# Patient Record
Sex: FEMALE | Race: WHITE | Employment: OTHER | URBAN - METROPOLITAN AREA
[De-identification: names, ages, dates, MRNs, and addresses within clinical notes are randomized per-mention and may not be internally consistent; named-entity substitution may affect disease eponyms.]

---

## 2017-09-27 ENCOUNTER — OFFICE VISIT (OUTPATIENT)
Dept: FAMILY MEDICINE CLINIC | Facility: CLINIC | Age: 67
End: 2017-09-27

## 2017-09-27 ENCOUNTER — LAB ENCOUNTER (OUTPATIENT)
Dept: LAB | Age: 67
End: 2017-09-27
Attending: FAMILY MEDICINE
Payer: MEDICARE

## 2017-09-27 VITALS
DIASTOLIC BLOOD PRESSURE: 78 MMHG | HEIGHT: 61.42 IN | RESPIRATION RATE: 16 BRPM | HEART RATE: 73 BPM | BODY MASS INDEX: 24.04 KG/M2 | SYSTOLIC BLOOD PRESSURE: 132 MMHG | WEIGHT: 129 LBS

## 2017-09-27 DIAGNOSIS — Z13.6 SCREENING, ISCHEMIC HEART DISEASE: ICD-10-CM

## 2017-09-27 DIAGNOSIS — Z00.00 MEDICARE ANNUAL WELLNESS VISIT, INITIAL: Primary | ICD-10-CM

## 2017-09-27 DIAGNOSIS — Z12.31 VISIT FOR SCREENING MAMMOGRAM: ICD-10-CM

## 2017-09-27 DIAGNOSIS — Z78.0 POSTMENOPAUSAL: ICD-10-CM

## 2017-09-27 DIAGNOSIS — Z13.1 SCREENING FOR DIABETES MELLITUS: ICD-10-CM

## 2017-09-27 DIAGNOSIS — M85.80 OSTEOPENIA, UNSPECIFIED LOCATION: ICD-10-CM

## 2017-09-27 DIAGNOSIS — M89.9 BONE DISORDER: ICD-10-CM

## 2017-09-27 DIAGNOSIS — Z12.11 SCREENING FOR MALIGNANT NEOPLASM OF COLON: ICD-10-CM

## 2017-09-27 LAB
25-HYDROXYVITAMIN D (TOTAL): 36.9 NG/ML (ref 30–100)
ALBUMIN SERPL-MCNC: 3.7 G/DL (ref 3.5–4.8)
ALP LIVER SERPL-CCNC: 59 U/L (ref 55–142)
ALT SERPL-CCNC: 25 U/L (ref 14–54)
AST SERPL-CCNC: 16 U/L (ref 15–41)
BASOPHILS # BLD AUTO: 0.02 X10(3) UL (ref 0–0.1)
BASOPHILS NFR BLD AUTO: 0.5 %
BILIRUB SERPL-MCNC: 0.5 MG/DL (ref 0.1–2)
BUN BLD-MCNC: 12 MG/DL (ref 8–20)
CALCIUM BLD-MCNC: 8.6 MG/DL (ref 8.3–10.3)
CHLORIDE: 106 MMOL/L (ref 101–111)
CHOLEST SMN-MCNC: 231 MG/DL (ref ?–200)
CO2: 27 MMOL/L (ref 22–32)
CREAT BLD-MCNC: 0.65 MG/DL (ref 0.55–1.02)
EOSINOPHIL # BLD AUTO: 0.04 X10(3) UL (ref 0–0.3)
EOSINOPHIL NFR BLD AUTO: 1 %
ERYTHROCYTE [DISTWIDTH] IN BLOOD BY AUTOMATED COUNT: 14.4 % (ref 11.5–16)
GLUCOSE BLD-MCNC: 93 MG/DL (ref 70–99)
HCT VFR BLD AUTO: 40.4 % (ref 34–50)
HDLC SERPL-MCNC: 65 MG/DL (ref 45–?)
HDLC SERPL: 3.55 {RATIO} (ref ?–4.44)
HGB BLD-MCNC: 13.6 G/DL (ref 12–16)
IMMATURE GRANULOCYTE COUNT: 0.01 X10(3) UL (ref 0–1)
IMMATURE GRANULOCYTE RATIO %: 0.2 %
LDLC SERPL CALC-MCNC: 147 MG/DL (ref ?–130)
LDLC SERPL-MCNC: 19 MG/DL (ref 5–40)
LYMPHOCYTES # BLD AUTO: 1.04 X10(3) UL (ref 0.9–4)
LYMPHOCYTES NFR BLD AUTO: 24.9 %
M PROTEIN MFR SERPL ELPH: 7.1 G/DL (ref 6.1–8.3)
MCH RBC QN AUTO: 30.1 PG (ref 27–33.2)
MCHC RBC AUTO-ENTMCNC: 33.7 G/DL (ref 31–37)
MCV RBC AUTO: 89.4 FL (ref 81–100)
MONOCYTES # BLD AUTO: 0.26 X10(3) UL (ref 0.1–0.6)
MONOCYTES NFR BLD AUTO: 6.2 %
NEUTROPHIL ABS PRELIM: 2.8 X10 (3) UL (ref 1.3–6.7)
NEUTROPHILS # BLD AUTO: 2.8 X10(3) UL (ref 1.3–6.7)
NEUTROPHILS NFR BLD AUTO: 67.2 %
NONHDLC SERPL-MCNC: 166 MG/DL (ref ?–130)
PLATELET # BLD AUTO: 271 10(3)UL (ref 150–450)
POTASSIUM SERPL-SCNC: 4 MMOL/L (ref 3.6–5.1)
RBC # BLD AUTO: 4.52 X10(6)UL (ref 3.8–5.1)
RED CELL DISTRIBUTION WIDTH-SD: 46.6 FL (ref 35.1–46.3)
SODIUM SERPL-SCNC: 139 MMOL/L (ref 136–144)
TRIGLYCERIDES: 97 MG/DL (ref ?–150)
WBC # BLD AUTO: 4.2 X10(3) UL (ref 4–13)

## 2017-09-27 PROCEDURE — 85025 COMPLETE CBC W/AUTO DIFF WBC: CPT

## 2017-09-27 PROCEDURE — 80053 COMPREHEN METABOLIC PANEL: CPT

## 2017-09-27 PROCEDURE — 80061 LIPID PANEL: CPT

## 2017-09-27 PROCEDURE — 36415 COLL VENOUS BLD VENIPUNCTURE: CPT

## 2017-09-27 PROCEDURE — G0438 PPPS, INITIAL VISIT: HCPCS | Performed by: FAMILY MEDICINE

## 2017-09-27 PROCEDURE — 82306 VITAMIN D 25 HYDROXY: CPT

## 2017-09-27 RX ORDER — MULTIVIT-MIN/IRON/FOLIC ACID/K 18-600-40
1 CAPSULE ORAL DAILY
COMMUNITY

## 2017-09-27 NOTE — PATIENT INSTRUCTIONS
Gabby Crystal's SCREENING SCHEDULE   Tests on this list are recommended by your physician but may not be covered, or covered at this frequency, by your insurer. Please check with your insurance carrier before scheduling to verify coverage.    PREVENTATIV results found for this or any previous visit. No flowsheet data found. Fecal Occult Blood   Covered Annually No results found for: FOB, OCCULTSTOOL No flowsheet data found.      Barium Enema-   uncomfortable but covered  Covered but uncomfortable   Glau this or any previous visit.  Medium/high risk factors:   End-stage renal disease   Hemophiliacs who received Factor VIII or IX concentrates   Clients of institutions for the mentally retarded   Persons who live in the same house as a HepB virus carrier   [facility]

## 2017-09-27 NOTE — PROGRESS NOTES
HPI:   Sarah Montaño is a 75year old female who presents for a Medicare Initial Annual Wellness visit (Once after 12 month Medicare anniversary) .         Patient Care Team: Patient Care Team:  Catalina Kaba DO as PCP - [facility]) congestion, sinus pain or ST  LUNGS: denies shortness of breath with exertion  CARDIOVASCULAR: denies chest pain on exertion  GI: denies abdominal pain, denies heartburn  : denies dysuria, vaginal discharge or itching, no complaint of urinary incontinenc Zoster, Tetanus     There is no immunization history on file for this patient. ASSESSMENT AND OTHER RELEVANT CHRONIC CONDITIONS:   Odalys Ricci is a 75year old female who presents for a Medicare Assessment.      Reviewed with pt in detail: PMANGE, PSCARMEN, S (14); Future  -     VITAMIN D, 25-HYDROXY; Future    Visit for screening mammogram  -     JOSE D SCREENING BILAT (CPT=77067);  Future    Screening for malignant neoplasm of colon  -     SURGERY - INTERNAL    Screening, ischemic heart disease  -     LIPID PANEL Showering: Able without help    Toileting: Able without help    Dressing: Able without help    Eating: Able without help    Driving: Able without help    Preparing your meals: Able without help    Managing money/bills: Able without help    Taking medicatio Lab or Procedure External Lab or Procedure   Diabetes Screening      HbgA1C   Annually No results found for: A1C No flowsheet data found.     Fasting Blood Sugar (FSB)Annually   Glucose (mg/dL)   Date Value   09/27/2017 93   ----------       Cardiovascular Medium/high risk factors:   End-stage renal disease   Hemophiliacs who received Factor VIII or IX concentrates   Clients of institutions for the mentally retarded   Persons who live in the same house as a HepB virus carrier   Homosexual men   Illicit injec

## 2017-10-04 ENCOUNTER — TELEPHONE (OUTPATIENT)
Dept: FAMILY MEDICINE CLINIC | Facility: CLINIC | Age: 67
End: 2017-10-04

## 2017-10-04 NOTE — TELEPHONE ENCOUNTER
----- Message from Jonathon Sky DO sent at 10/2/2017 11:56 PM CDT -----  Please inform patient that her total cholesterol and LDL, AKA bad cholesterol, please discuss therapeutic lifestyle changes.   Rest of blood test results normal.  Please encourage

## 2017-10-04 NOTE — TELEPHONE ENCOUNTER
Lm on private voicemail, per signed consent, with test results and recommendations. Pt advised to call the office with any questions or concerns.

## 2017-10-26 ENCOUNTER — HOSPITAL ENCOUNTER (OUTPATIENT)
Dept: BONE DENSITY | Age: 67
Discharge: HOME | End: 2017-10-26
Attending: FAMILY MEDICINE
Payer: MEDICARE

## 2017-10-26 ENCOUNTER — HOSPITAL ENCOUNTER (OUTPATIENT)
Dept: MAMMOGRAPHY | Age: 67
Discharge: HOME | End: 2017-10-26
Attending: FAMILY MEDICINE
Payer: MEDICARE

## 2017-10-26 DIAGNOSIS — Z78.0 POSTMENOPAUSAL: ICD-10-CM

## 2017-10-26 DIAGNOSIS — Z12.31 VISIT FOR SCREENING MAMMOGRAM: ICD-10-CM

## 2017-10-26 DIAGNOSIS — M85.80 OSTEOPENIA, UNSPECIFIED LOCATION: ICD-10-CM

## 2017-10-26 PROCEDURE — 77067 SCR MAMMO BI INCL CAD: CPT | Performed by: FAMILY MEDICINE

## 2017-10-26 PROCEDURE — 77080 DXA BONE DENSITY AXIAL: CPT | Performed by: FAMILY MEDICINE

## 2017-10-30 ENCOUNTER — TELEPHONE (OUTPATIENT)
Dept: FAMILY MEDICINE CLINIC | Facility: CLINIC | Age: 67
End: 2017-10-30

## 2017-10-30 NOTE — TELEPHONE ENCOUNTER
----- Message from Nas Lopez DO sent at 10/27/2017  3:48 PM CDT -----  Please have pt make appt to discuss test results in person to discuss plan to prevent progression/improve.   DEXA c/w osteopenia which is thinning of the bone but not to the point

## 2018-09-26 ENCOUNTER — TELEPHONE (OUTPATIENT)
Dept: FAMILY MEDICINE CLINIC | Facility: CLINIC | Age: 68
End: 2018-09-26

## 2018-10-16 ENCOUNTER — OFFICE VISIT (OUTPATIENT)
Dept: FAMILY MEDICINE CLINIC | Facility: CLINIC | Age: 68
End: 2018-10-16
Payer: MEDICARE

## 2018-10-16 VITALS
TEMPERATURE: 97 F | BODY MASS INDEX: 23.48 KG/M2 | RESPIRATION RATE: 16 BRPM | OXYGEN SATURATION: 97 % | HEIGHT: 61.25 IN | SYSTOLIC BLOOD PRESSURE: 124 MMHG | WEIGHT: 126 LBS | HEART RATE: 84 BPM | DIASTOLIC BLOOD PRESSURE: 72 MMHG

## 2018-10-16 DIAGNOSIS — Z00.00 MEDICARE ANNUAL WELLNESS VISIT, SUBSEQUENT: Primary | ICD-10-CM

## 2018-10-16 DIAGNOSIS — Z23 NEED FOR VACCINATION: ICD-10-CM

## 2018-10-16 DIAGNOSIS — D48.5 NEOPLASM OF UNCERTAIN BEHAVIOR OF SKIN OF FACE: ICD-10-CM

## 2018-10-16 DIAGNOSIS — Z12.11 SCREENING FOR MALIGNANT NEOPLASM OF COLON: ICD-10-CM

## 2018-10-16 DIAGNOSIS — Z12.39 SCREENING FOR MALIGNANT NEOPLASM OF BREAST: ICD-10-CM

## 2018-10-16 DIAGNOSIS — L82.1 SEBORRHEIC KERATOSES: ICD-10-CM

## 2018-10-16 PROCEDURE — G0008 ADMIN INFLUENZA VIRUS VAC: HCPCS | Performed by: FAMILY MEDICINE

## 2018-10-16 PROCEDURE — G0439 PPPS, SUBSEQ VISIT: HCPCS | Performed by: FAMILY MEDICINE

## 2018-10-16 PROCEDURE — 90653 IIV ADJUVANT VACCINE IM: CPT | Performed by: FAMILY MEDICINE

## 2018-10-16 NOTE — PROGRESS NOTES
HPI:   Jackie Lopez is a 70year old female who presents for a Medicare Subsequent Annual Wellness visit (Pt already had Initial Annual Wellness).             Fall/Risk Assessment   She has been screened for Falls and is low risk: Fall/Risk Scorin Last Chemistry Labs:   Lab Results   Component Value Date    AST 16 09/27/2017    ALT 25 09/27/2017    CA 8.6 09/27/2017    ALB 3.7 09/27/2017    CREATSERUM 0.65 09/27/2017    GLU 93 09/27/2017        CBC  (most recent labs)   Lab Results   Component /72   Pulse 84   Temp 97.2 °F (36.2 °C) (Oral)   Resp 16   Ht 61.25\"   Wt 126 lb   SpO2 97%   BMI 23.61 kg/m²  Estimated body mass index is 23.61 kg/m² as calculated from the following:    Height as of this encounter: 61.25\".     Weight as of this 10-    2. Neoplasm of uncertain behavior of skin of face  Consult dermatologist, Dr. Josee Schuler, for further evaluation and treatment. - DERM - INTERNAL    3.  Seborrheic keratoses  Consult dermatologist, Dr. Josee Schuler, for further evaluation and treat do you maintain positive mental well-being?: Visiting Friends; Visiting Family;Games      This section provided for quick review of chart, separate sheet to patient  [address] Internal Lab or Procedure External Lab or P once after your 65th birthday    Pneumococcal 23 (Pneumovax)  Covered Once after 65 No vaccine history found Please get once after your 65th birthday    Hepatitis B for Moderate/High Risk No vaccine history found Medium/high risk factors:   End-stage renal

## 2018-10-16 NOTE — PATIENT INSTRUCTIONS
Recommended Websites for Advanced Directives    [email or web address].no. org/publications/Documents/personal_dec. pdf  An information packet, including necessary form from the [address] website. http://www. [place]state. [place]/public/books/adv

## 2018-11-30 ENCOUNTER — HOSPITAL ENCOUNTER (OUTPATIENT)
Dept: MAMMOGRAPHY | Age: 68
Discharge: HOME | End: 2018-11-30
Attending: FAMILY MEDICINE
Payer: MEDICARE

## 2018-11-30 DIAGNOSIS — Z12.39 SCREENING FOR MALIGNANT NEOPLASM OF BREAST: ICD-10-CM

## 2018-11-30 PROCEDURE — 77063 BREAST TOMOSYNTHESIS BI: CPT | Performed by: FAMILY MEDICINE

## 2018-11-30 PROCEDURE — 77067 SCR MAMMO BI INCL CAD: CPT | Performed by: FAMILY MEDICINE

## 2019-08-29 ENCOUNTER — HOSPITAL ENCOUNTER (OUTPATIENT)
Age: 69
Discharge: HOME | End: 2019-08-29
Attending: FAMILY MEDICINE
Payer: MEDICARE

## 2019-08-29 VITALS
RESPIRATION RATE: 16 BRPM | BODY MASS INDEX: 23.37 KG/M2 | HEIGHT: 62 IN | OXYGEN SATURATION: 99 % | SYSTOLIC BLOOD PRESSURE: 162 MMHG | TEMPERATURE: 98 F | HEART RATE: 71 BPM | WEIGHT: 127 LBS | DIASTOLIC BLOOD PRESSURE: 83 MMHG

## 2019-08-29 DIAGNOSIS — S61.215A LACERATION OF LEFT RING FINGER WITHOUT FOREIGN BODY WITHOUT DAMAGE TO NAIL, INITIAL ENCOUNTER: Primary | ICD-10-CM

## 2019-08-29 PROCEDURE — 99213 OFFICE O/P EST LOW 20 MIN: CPT

## 2019-08-29 PROCEDURE — 99203 OFFICE O/P NEW LOW 30 MIN: CPT

## 2019-08-29 NOTE — ED PROVIDER NOTES
Patient Seen in: THE [facility]    History   Patient presents with:  Laceration Abrasion (integumentary)    Stated Complaint: lacerations to finger left hand     HPI  48-year-old female with an open wound to the left ring finger, hernandez exudate. Eyes: Pupils are equal, round, and reactive to light. Conjunctivae and EOM are normal. Right eye exhibits no discharge. Left eye exhibits no discharge. Neck: Normal range of motion. Neck supple. No thyromegaly present.    Cardiovascular: Normal List

## 2019-08-29 NOTE — ED INITIAL ASSESSMENT (HPI)
Patient presents with skin avulsion to tip of left ring finger today while making a smoothie. +CMS. Right hand dominent.

## 2019-10-25 ENCOUNTER — PATIENT OUTREACH (OUTPATIENT)
Dept: FAMILY MEDICINE CLINIC | Facility: CLINIC | Age: 69
End: 2019-10-25

## 2019-11-15 ENCOUNTER — OFFICE VISIT (OUTPATIENT)
Dept: FAMILY MEDICINE CLINIC | Facility: CLINIC | Age: 69
End: 2019-11-15
Payer: MEDICARE

## 2019-11-15 VITALS
SYSTOLIC BLOOD PRESSURE: 150 MMHG | HEART RATE: 75 BPM | DIASTOLIC BLOOD PRESSURE: 90 MMHG | TEMPERATURE: 98 F | HEIGHT: 61.25 IN | WEIGHT: 127 LBS | OXYGEN SATURATION: 97 % | BODY MASS INDEX: 23.67 KG/M2 | RESPIRATION RATE: 18 BRPM

## 2019-11-15 DIAGNOSIS — D48.5 NEOPLASM OF UNCERTAIN BEHAVIOR OF SKIN: ICD-10-CM

## 2019-11-15 DIAGNOSIS — Z00.00 MEDICARE ANNUAL WELLNESS VISIT, SUBSEQUENT: Primary | ICD-10-CM

## 2019-11-15 DIAGNOSIS — Z78.0 POSTMENOPAUSAL: ICD-10-CM

## 2019-11-15 DIAGNOSIS — R03.0 ELEVATED BLOOD PRESSURE READING WITHOUT DIAGNOSIS OF HYPERTENSION: ICD-10-CM

## 2019-11-15 DIAGNOSIS — Z23 NEED FOR VACCINATION: ICD-10-CM

## 2019-11-15 DIAGNOSIS — M85.89 OSTEOPENIA OF MULTIPLE SITES: ICD-10-CM

## 2019-11-15 DIAGNOSIS — L82.1 SEBORRHEIC KERATOSIS: ICD-10-CM

## 2019-11-15 DIAGNOSIS — Z12.31 ENCOUNTER FOR SCREENING MAMMOGRAM FOR MALIGNANT NEOPLASM OF BREAST: ICD-10-CM

## 2019-11-15 DIAGNOSIS — E78.00 HYPERCHOLESTEROLEMIA: ICD-10-CM

## 2019-11-15 DIAGNOSIS — Z53.20 COLONOSCOPY REFUSED: ICD-10-CM

## 2019-11-15 PROCEDURE — 90670 PCV13 VACCINE IM: CPT | Performed by: FAMILY MEDICINE

## 2019-11-15 PROCEDURE — 99214 OFFICE O/P EST MOD 30 MIN: CPT | Performed by: FAMILY MEDICINE

## 2019-11-15 PROCEDURE — G0439 PPPS, SUBSEQ VISIT: HCPCS | Performed by: FAMILY MEDICINE

## 2019-11-15 PROCEDURE — G0009 ADMIN PNEUMOCOCCAL VACCINE: HCPCS | Performed by: FAMILY MEDICINE

## 2019-11-15 NOTE — PROGRESS NOTES
HPI:   Ileana Sow is a 78year old female who presents for a Medicare Subsequent Annual Wellness visit (Pt already had Initial Annual Wellness), hypercholesterolemia, osteopenia, and Derm problems. Patient refuses colonoscopy.       Hypercholeste to patient in AVS         She has never smoked tobacco.    CAGE Alcohol screening   Jackie Lopez was screened for Alcohol abuse and had a score of 0 so is at low risk.     Patient Care Team: Patient Care Team:  Frederick Gonzalez DO as PCP - [facility] reports that she has never smoked. She has never used smokeless tobacco. She reports current alcohol use. She reports that she does not use drugs.      REVIEW OF SYSTEMS:   GENERAL: feels well overall  SKIN: Complains of multiple skin lesions that are disc of trunk as well as some macular hyperpigmented lesions.    Lymph nodes: Cervical, supraclavicular, and axillary nodes normal   Neurologic: Normal    and Breasts:  normal appearance, no masses or tenderness, Inspection negative, No nipple retraction or dimp osteoporosis. - XR DEXA BONE DENSITOMETRY (CPT=77080); Future    7. Elevated blood pressure reading without diagnosis of hypertension  Patient to return to clinic in 4 weeks for recheck of blood pressure. Patient counseled on low-sodium diet.     8. Neopl Cardiovascular Disease Screening     LDL Annually LDL Cholesterol (mg/dL)   Date Value   09/27/2017 147 (H)        EKG - w/ Initial Preventative Physical Exam only, or if medically necessary Electrocardiogram date       Colorectal Cancer Screening      C Homosexual men   Illicit injectable drug abusers     Tetanus Toxoid  Only covered with a cut with metal- TD and TDaP Not covered by Medicare Part B No vaccine history found This may be covered with your prescription benefits, but Medicare does not cover

## 2019-11-15 NOTE — PATIENT INSTRUCTIONS
Recommended Websites for Advanced Directives    SeekAlumni.no. org/publications/Documents/personal_dec. pdf  An information packet, including necessary form from the [employer] website. http://www. [place]. [place]/public/books/adv

## 2019-11-24 PROBLEM — M85.89 OSTEOPENIA OF MULTIPLE SITES: Status: ACTIVE | Noted: 2019-11-24

## 2019-11-24 PROBLEM — L82.1 SEBORRHEIC KERATOSIS: Status: ACTIVE | Noted: 2019-11-24

## 2019-11-24 PROBLEM — E78.00 HYPERCHOLESTEROLEMIA: Status: ACTIVE | Noted: 2019-11-24

## 2019-11-24 PROBLEM — D48.5 NEOPLASM OF UNCERTAIN BEHAVIOR OF SKIN: Status: ACTIVE | Noted: 2019-11-24

## 2019-12-13 ENCOUNTER — OFFICE VISIT (OUTPATIENT)
Dept: FAMILY MEDICINE CLINIC | Facility: CLINIC | Age: 69
End: 2019-12-13
Payer: MEDICARE

## 2019-12-13 VITALS
HEART RATE: 67 BPM | DIASTOLIC BLOOD PRESSURE: 84 MMHG | WEIGHT: 128 LBS | SYSTOLIC BLOOD PRESSURE: 144 MMHG | HEIGHT: 61.25 IN | TEMPERATURE: 98 F | OXYGEN SATURATION: 97 % | BODY MASS INDEX: 23.86 KG/M2

## 2019-12-13 DIAGNOSIS — Z76.89 ENCOUNTER FOR NEW MEDICATION PRESCRIPTION: ICD-10-CM

## 2019-12-13 DIAGNOSIS — E78.00 HYPERCHOLESTEROLEMIA: ICD-10-CM

## 2019-12-13 DIAGNOSIS — I10 ESSENTIAL HYPERTENSION: Primary | ICD-10-CM

## 2019-12-13 PROCEDURE — 93000 ELECTROCARDIOGRAM COMPLETE: CPT | Performed by: FAMILY MEDICINE

## 2019-12-13 PROCEDURE — 99214 OFFICE O/P EST MOD 30 MIN: CPT | Performed by: FAMILY MEDICINE

## 2019-12-13 RX ORDER — HYDROCHLOROTHIAZIDE 12.5 MG/1
12.5 CAPSULE, GELATIN COATED ORAL EVERY MORNING
Qty: 30 CAPSULE | Refills: 1 | Status: SHIPPED | OUTPATIENT
Start: 2019-12-13 | End: 2020-02-13

## 2019-12-13 NOTE — PATIENT INSTRUCTIONS
Low-Salt Choices  Eating salt (sodium) can make your body retain too much water. Excess water makes your heart work harder. Canned, packaged, and frozen foods are easy to prepare. But they are often high in sodium.  Here are some ideas for low-salt foods You may wonder how you can improve the health of your heart. If you’re thinking about exercise, you’re on the right track. You don’t need to become an athlete, but you do need a certain amount of brisk exercise to help strengthen your heart.  If you have be Choose one or more activities you enjoy. Walking is one of the easiest things you can do. You can also try swimming, riding a bike, dancing, or taking an exercise class.   Stop exercising and call your doctor if you:  · Have chest pain or feel dizzy or ligh

## 2019-12-13 NOTE — PROGRESS NOTES
Trudy Rodriguez is a 78year old female. HPI:     HTN:    New diagnosis today. Severity is likely mild. Diet: Somewhat watches diet. Exercise: using stepper at home 10-15 mins and exercise bike 15 mins, exercise most days of the week. symptoms  GI: Denies abdominal pain/nausea/vomiting/blood in stool/black stool/bloating/constipation/diarrhea  : denies urinary symptoms  NEURO: denies headaches/dizziness/fainting/weakness/change in vision  PSYCH: denies depression and anxiety      Immu x10(3) uL 2.80   Lymphocytes Absolute      0.90 - 4.00 x10(3) uL 1.04   Monocytes Absolute      0.10 - 0.60 x10(3) uL 0.26   Eosinophils Absolute      0.00 - 0.30 x10(3) uL 0.04   Basophils Absolute      0.00 - 0.10 x10(3) uL 0.02   Immature Granulocyte Ab mouth every morning. Dispense: 30 capsule; Refill: 1  - ELECTROCARDIOGRAM, COMPLETE    2. Hypercholesterolemia  Recheck lipid panel.    - COMP METABOLIC PANEL (14); Future  - LIPID PANEL; Future    3.  Encounter for new medication prescription  Start hydro

## 2019-12-27 ENCOUNTER — LAB ENCOUNTER (OUTPATIENT)
Dept: LAB | Age: 69
End: 2019-12-27
Attending: FAMILY MEDICINE
Payer: MEDICARE

## 2019-12-27 DIAGNOSIS — E78.00 HYPERCHOLESTEROLEMIA: ICD-10-CM

## 2019-12-27 DIAGNOSIS — I10 ESSENTIAL HYPERTENSION: ICD-10-CM

## 2019-12-27 PROCEDURE — 85025 COMPLETE CBC W/AUTO DIFF WBC: CPT

## 2019-12-27 PROCEDURE — 36415 COLL VENOUS BLD VENIPUNCTURE: CPT

## 2019-12-27 PROCEDURE — 80053 COMPREHEN METABOLIC PANEL: CPT

## 2019-12-27 PROCEDURE — 80061 LIPID PANEL: CPT

## 2019-12-27 PROCEDURE — 84443 ASSAY THYROID STIM HORMONE: CPT

## 2019-12-27 PROCEDURE — 84439 ASSAY OF FREE THYROXINE: CPT

## 2019-12-28 PROBLEM — I10 ESSENTIAL HYPERTENSION: Status: ACTIVE | Noted: 2019-12-28

## 2020-01-04 ENCOUNTER — HOSPITAL ENCOUNTER (OUTPATIENT)
Dept: MAMMOGRAPHY | Age: 70
Discharge: HOME | End: 2020-01-04
Attending: FAMILY MEDICINE
Payer: MEDICARE

## 2020-01-04 ENCOUNTER — HOSPITAL ENCOUNTER (OUTPATIENT)
Dept: BONE DENSITY | Age: 70
Discharge: HOME | End: 2020-01-04
Attending: FAMILY MEDICINE
Payer: MEDICARE

## 2020-01-04 DIAGNOSIS — Z78.0 POSTMENOPAUSAL: ICD-10-CM

## 2020-01-04 DIAGNOSIS — M85.89 OSTEOPENIA OF MULTIPLE SITES: ICD-10-CM

## 2020-01-04 DIAGNOSIS — Z12.31 ENCOUNTER FOR SCREENING MAMMOGRAM FOR MALIGNANT NEOPLASM OF BREAST: ICD-10-CM

## 2020-01-04 PROCEDURE — 77067 SCR MAMMO BI INCL CAD: CPT | Performed by: FAMILY MEDICINE

## 2020-01-04 PROCEDURE — 77080 DXA BONE DENSITY AXIAL: CPT | Performed by: FAMILY MEDICINE

## 2020-01-04 PROCEDURE — 77063 BREAST TOMOSYNTHESIS BI: CPT | Performed by: FAMILY MEDICINE

## 2020-01-13 ENCOUNTER — HOSPITAL ENCOUNTER (OUTPATIENT)
Dept: MAMMOGRAPHY | Age: 70
Discharge: HOME | End: 2020-01-13
Attending: FAMILY MEDICINE
Payer: MEDICARE

## 2020-01-13 ENCOUNTER — HOSPITAL ENCOUNTER (OUTPATIENT)
Dept: ULTRASOUND IMAGING | Age: 70
Discharge: HOME | End: 2020-01-13
Attending: FAMILY MEDICINE
Payer: MEDICARE

## 2020-01-13 DIAGNOSIS — R92.2 INCONCLUSIVE MAMMOGRAM: ICD-10-CM

## 2020-01-13 PROCEDURE — 77065 DX MAMMO INCL CAD UNI: CPT | Performed by: FAMILY MEDICINE

## 2020-01-13 PROCEDURE — 77061 BREAST TOMOSYNTHESIS UNI: CPT | Performed by: FAMILY MEDICINE

## 2020-01-13 PROCEDURE — 76642 ULTRASOUND BREAST LIMITED: CPT | Performed by: FAMILY MEDICINE

## 2020-02-13 ENCOUNTER — OFFICE VISIT (OUTPATIENT)
Dept: FAMILY MEDICINE CLINIC | Facility: CLINIC | Age: 70
End: 2020-02-13
Payer: MEDICARE

## 2020-02-13 VITALS
DIASTOLIC BLOOD PRESSURE: 78 MMHG | WEIGHT: 127 LBS | HEART RATE: 78 BPM | HEIGHT: 61.25 IN | SYSTOLIC BLOOD PRESSURE: 126 MMHG | BODY MASS INDEX: 23.67 KG/M2 | TEMPERATURE: 99 F | OXYGEN SATURATION: 97 %

## 2020-02-13 DIAGNOSIS — M89.9 BONE DISORDER: ICD-10-CM

## 2020-02-13 DIAGNOSIS — I10 ESSENTIAL HYPERTENSION: Primary | ICD-10-CM

## 2020-02-13 DIAGNOSIS — E78.00 HYPERCHOLESTEROLEMIA: ICD-10-CM

## 2020-02-13 DIAGNOSIS — M81.0 AGE-RELATED OSTEOPOROSIS WITHOUT CURRENT PATHOLOGICAL FRACTURE: ICD-10-CM

## 2020-02-13 PROCEDURE — 99214 OFFICE O/P EST MOD 30 MIN: CPT | Performed by: FAMILY MEDICINE

## 2020-02-13 RX ORDER — HYDROCHLOROTHIAZIDE 12.5 MG/1
12.5 CAPSULE, GELATIN COATED ORAL EVERY MORNING
Qty: 90 CAPSULE | Refills: 1 | Status: SHIPPED | OUTPATIENT
Start: 2020-02-13 | End: 2020-07-28

## 2020-02-13 NOTE — PATIENT INSTRUCTIONS
Understanding Your Cholesterol Numbers  The higher your blood cholesterol, the greater your risk for heart attack (acute myocardial infarction), cardiovascular disease, or stroke. High cholesterol can cause any artery in your body to become narrow.  That’ Total cholesterol is just one part of the story. Cholesterol is made up of different kinds of fats (lipids). If your total cholesterol is high, knowing your lipid profile is important. The 2 most important lipids are HDL and LDL.  Lipids are checked after y · Adults who have had a heart attack or stroke. Or adults who have had peripheral vascular disease, a ministroke (transient ischemic attack), or stable or unstable angina.  This group also includes people who have had a procedure to restore blood flow liliu Lipids are fats, and blood is mostly water. Fat and water don't mix. So our bodies need lipoproteins (lipids inside a protein shell) to carry the lipids.  The protein shell carries its lipids through the bloodstream. There are two main kinds of lipoproteins · If you haven't been exercising regularly, start slowly. Check with your healthcare provider to make sure the exercise plan is right for you. Quit smoking  Quitting smoking can improve your lipid levels.  It also lowers your risk for heart disease and str

## 2020-02-13 NOTE — PROGRESS NOTES
Darinel Henry is a 69year old female. HPI:     HTN:    Improved. This is pt's first f/u since diagnosis and start of hydrochlorothiazide 12.5 mg q am.  Severity is mild, pt is on one agent for her hypertension, hctz.   Pt has been taking medications Maternal Grandfather    • Heart Attack Paternal Grandfather      REVIEW OF SYSTEMS:   GENERAL HEALTH: overall feels well, no fever, no change in weight  SKIN: denies any unusual skin lesions or rashes   RESPIRATORY: Denies: GERMAN/MENDIOLA/Cough/Wheeze/Orthopnea/P INDICATIONS:    M85.89 Other specified disorders of bone density and structure, multiple sites Z78.0 Asymptomatic menopausal state     PATIENT STATED HISTORY: (As transcribed by Technologist)  Dexa Bone Density  Postmenopausal  Osteopenia of multiple sit MCH      26.0 - 34.0 pg  29.4 30.1   MCHC      31.0 - 37.0 g/dL  32.3 33.7   RDW      11.0 - 15.0 %  14.6 14.4   RDW-SD      35.1 - 46.3 fL  49.0 (H) 46.6 (H)   Prelim Neutrophil Abs      1.50 - 7.70 x10 (3) uL  2.26 2.80   Neutrophils Absolute      1.50 26 19   Chol/HDL Ratio      <4.44   3.55   NON HDL CHOL      <130 mg/dL  210 (H) 166 (H)   T4,Free (Direct)      0.8 - 1.7 ng/dL  1.0    TSH      0.358 - 3.740 mIU/mL  1.340          ASSESSMENT AND PLAN:       Essential hypertension  (primary encounter di

## 2020-02-14 ENCOUNTER — APPOINTMENT (OUTPATIENT)
Dept: LAB | Age: 70
End: 2020-02-14
Attending: FAMILY MEDICINE
Payer: MEDICARE

## 2020-02-14 DIAGNOSIS — M89.9 BONE DISORDER: ICD-10-CM

## 2020-02-14 DIAGNOSIS — M81.0 AGE-RELATED OSTEOPOROSIS WITHOUT CURRENT PATHOLOGICAL FRACTURE: ICD-10-CM

## 2020-02-14 LAB — VIT D+METAB SERPL-MCNC: 56.6 NG/ML (ref 30–100)

## 2020-02-14 PROCEDURE — 82306 VITAMIN D 25 HYDROXY: CPT

## 2020-02-14 PROCEDURE — 36415 COLL VENOUS BLD VENIPUNCTURE: CPT

## 2020-02-24 ENCOUNTER — OFFICE VISIT (OUTPATIENT)
Dept: NUTRITION | Age: 70
End: 2020-02-24
Attending: FAMILY MEDICINE
Payer: MEDICARE

## 2020-02-24 PROCEDURE — 97802 MEDICAL NUTRITION INDIV IN: CPT

## 2020-02-24 NOTE — PROGRESS NOTES
ADULT INITIAL OUTPATIENT NUTRITION CONSULTATION    Nutrition Assessment    Medical Diagnosis: hyperlipidemia    PMH: osteopenia    Client Hx: 69year old female    Meds: noted    Labs (19): GLU: 96, CHOL: 284, LDL: 184, HDL: 74, T    ANTHROPOM control and label reading reviewed. Written materials were issued and explained, including meal plans, recipes, and references/web sites. Pt has set goals to follow recommendations set today, and to contact RD with further questions or concerns.           [facility]

## 2020-07-28 DIAGNOSIS — I10 ESSENTIAL HYPERTENSION: ICD-10-CM

## 2020-07-28 RX ORDER — HYDROCHLOROTHIAZIDE 12.5 MG/1
12.5 CAPSULE, GELATIN COATED ORAL EVERY MORNING
Qty: 90 CAPSULE | Refills: 1 | Status: SHIPPED | OUTPATIENT
Start: 2020-07-28 | End: 2020-11-18

## 2020-07-28 NOTE — TELEPHONE ENCOUNTER
Patient requesting refill on hydrochlorothiazide 12.5 MG Oral Cap be sent to [facility] on file

## 2020-09-25 ENCOUNTER — TELEPHONE (OUTPATIENT)
Dept: FAMILY MEDICINE CLINIC | Facility: CLINIC | Age: 70
End: 2020-09-25

## 2020-09-25 NOTE — TELEPHONE ENCOUNTER
Antibiotics are no longer recommended prior to dental procedures for patients who have knee or hip replacements.

## 2020-09-25 NOTE — TELEPHONE ENCOUNTER
Spoke to patient. Says she always takes antibiotic before teeth cleaning d/t left knee replacement. States old dentist used to give it to her.   Says this new dentist wants her to receive medication from PCP first, then they will provide subsequent doses w

## 2020-09-25 NOTE — TELEPHONE ENCOUNTER
Pt said she will need 2 tablets of Clindamycin prior to her dentist visit. She would like it sent to [place] [facility]. Her dentist told her she needed to get it from her primary doctor.

## 2020-10-20 ENCOUNTER — PATIENT OUTREACH (OUTPATIENT)
Dept: FAMILY MEDICINE CLINIC | Facility: CLINIC | Age: 70
End: 2020-10-20

## 2020-11-18 ENCOUNTER — OFFICE VISIT (OUTPATIENT)
Dept: FAMILY MEDICINE CLINIC | Facility: CLINIC | Age: 70
End: 2020-11-18
Payer: MEDICARE

## 2020-11-18 VITALS
TEMPERATURE: 97 F | OXYGEN SATURATION: 97 % | HEART RATE: 77 BPM | DIASTOLIC BLOOD PRESSURE: 64 MMHG | HEIGHT: 61 IN | BODY MASS INDEX: 24.1 KG/M2 | SYSTOLIC BLOOD PRESSURE: 120 MMHG | WEIGHT: 127.63 LBS

## 2020-11-18 DIAGNOSIS — Z12.11 ENCOUNTER FOR HEMOCCULT SCREENING: ICD-10-CM

## 2020-11-18 DIAGNOSIS — Z79.899 ENCOUNTER FOR LONG-TERM CURRENT USE OF MEDICATION: ICD-10-CM

## 2020-11-18 DIAGNOSIS — Z23 NEED FOR VACCINATION: ICD-10-CM

## 2020-11-18 DIAGNOSIS — H02.9 LESION OF LEFT UPPER EYELID: ICD-10-CM

## 2020-11-18 DIAGNOSIS — I10 ESSENTIAL HYPERTENSION: ICD-10-CM

## 2020-11-18 DIAGNOSIS — Z00.00 MEDICARE ANNUAL WELLNESS VISIT, SUBSEQUENT: Primary | ICD-10-CM

## 2020-11-18 DIAGNOSIS — D48.5 NEOPLASM OF UNCERTAIN BEHAVIOR OF SKIN: ICD-10-CM

## 2020-11-18 DIAGNOSIS — Z12.11 ENCOUNTER FOR SCREENING FOR MALIGNANT NEOPLASM OF COLON: ICD-10-CM

## 2020-11-18 DIAGNOSIS — E78.00 HYPERCHOLESTEROLEMIA: ICD-10-CM

## 2020-11-18 DIAGNOSIS — Z12.31 ENCOUNTER FOR SCREENING MAMMOGRAM FOR MALIGNANT NEOPLASM OF BREAST: ICD-10-CM

## 2020-11-18 DIAGNOSIS — L82.1 SEBORRHEIC KERATOSIS: ICD-10-CM

## 2020-11-18 PROCEDURE — 99214 OFFICE O/P EST MOD 30 MIN: CPT | Performed by: FAMILY MEDICINE

## 2020-11-18 PROCEDURE — G0009 ADMIN PNEUMOCOCCAL VACCINE: HCPCS | Performed by: FAMILY MEDICINE

## 2020-11-18 PROCEDURE — 90732 PPSV23 VACC 2 YRS+ SUBQ/IM: CPT | Performed by: FAMILY MEDICINE

## 2020-11-18 PROCEDURE — G0439 PPPS, SUBSEQ VISIT: HCPCS | Performed by: FAMILY MEDICINE

## 2020-11-18 RX ORDER — HYDROCHLOROTHIAZIDE 12.5 MG/1
12.5 CAPSULE, GELATIN COATED ORAL EVERY MORNING
Qty: 90 CAPSULE | Refills: 3 | Status: SHIPPED | OUTPATIENT
Start: 2020-11-18 | End: 2022-01-14

## 2020-11-18 NOTE — PATIENT INSTRUCTIONS
Linette Crystal's SCREENING SCHEDULE   Tests on this list are recommended by your physician but may not be covered, or covered at this frequency, by your insurer. Please check with your insurance carrier before scheduling to verify coverage.    PREVENTATIV more often if abnormal Colonoscopy due on 01/18/1999 Update [facility] if applicable    Flex Sigmoidoscopy Screen  Covered every 5 years No results found for this or any previous visit. No flowsheet data found.      Fecal Occult Blood   Covered Tawny Pneumococcal 23 (Pneumovax)  Covered Once after 65 No orders found for this or any previous visit. Please get once after your 65th birthday    Hepatitis B for Moderate/High Risk       No orders found for this or any previous visit.  Medium/high risk factors

## 2020-11-18 NOTE — PROGRESS NOTES
HPI:   Linda Moses is a 69year old female who presents for a Medicare Subsequent Annual Wellness visit (Pt already had Initial Annual Wellness) SKs and lesions of skin, hyperlipidemia, bump on eyelid, and Hypertension.            Fall/Risk Assessment of skin of face     Seborrheic keratoses     Hypercholesterolemia     Seborrheic keratosis     Neoplasm of uncertain behavior of skin     Osteopenia of multiple sites     Essential hypertension     Age-related osteoporosis without current pathological frac HISTORY:   She  reports that she has never smoked. She has never used smokeless tobacco. She reports current alcohol use. She reports that she does not use drugs. REVIEW OF SYSTEMS:   GENERAL: feels well overall  SKIN: Several moles or lesions of skin. Abdomen:   Soft, non-tender, no masses, no organomegaly   Pelvic: Deferred   Extremities: Extremities normal, atraumatic, no cyanosis or edema       Skin:  Scattered hyperpigmented lesions and SKs. Small papular mass of left upper eyelid margin.    Lymph 55 - 142 U/L 61  59   Total Bilirubin      0.1 - 2.0 mg/dL 0.5  0.5   TOTAL PROTEIN      6.4 - 8.2 g/dL 7.5  7.1   Albumin      3.4 - 5.0 g/dL 3.9  3.7   Globulin      2.8 - 4.4 g/dL 3.6     A/G Ratio      1.0 - 2.0 1.1     Patient Fasting?        Yes Yes (14); Future  - LIPID PANEL; Future  - TSH+FREE T4; Future  - hydrochlorothiazide 12.5 MG Oral Cap; Take 1 capsule (12.5 mg total) by mouth every morning. Dispense: 90 capsule; Refill: 3    7. Hypercholesterolemia  Elevated LDL.   We will have patient sche Sooner if needed. Judit Capellan DO, 11/18/2020     General Health     In the past six months, have you lost more than 10 pounds without trying?: 2 - No  Has your appetite been poor?: No  How does the patient maintain a good energy level?: Daily Maylin Maguire Chlamydia  Annually if high risk No results found for: CHLAMYDIA No flowsheet data found.     Screening Mammogram      Mammogram Annually to 75, then as discussed Mammogram due on 01/13/2021 Update Health Maintenance if applicable     Immunizations (Update

## 2021-01-13 ENCOUNTER — LAB ENCOUNTER (OUTPATIENT)
Dept: LAB | Age: 71
End: 2021-01-13
Attending: FAMILY MEDICINE
Payer: MEDICARE

## 2021-01-13 DIAGNOSIS — I10 ESSENTIAL HYPERTENSION: ICD-10-CM

## 2021-01-13 DIAGNOSIS — E78.00 HYPERCHOLESTEROLEMIA: ICD-10-CM

## 2021-01-13 LAB
ALBUMIN SERPL-MCNC: 4 G/DL (ref 3.4–5)
ALBUMIN/GLOB SERPL: 1.2 {RATIO} (ref 1–2)
ALP LIVER SERPL-CCNC: 55 U/L
ALT SERPL-CCNC: 20 U/L
ANION GAP SERPL CALC-SCNC: 6 MMOL/L (ref 0–18)
AST SERPL-CCNC: 12 U/L (ref 15–37)
BASOPHILS # BLD AUTO: 0.05 X10(3) UL (ref 0–0.2)
BASOPHILS NFR BLD AUTO: 1.1 %
BILIRUB SERPL-MCNC: 0.5 MG/DL (ref 0.1–2)
BUN BLD-MCNC: 14 MG/DL (ref 7–18)
BUN/CREAT SERPL: 17.9 (ref 10–20)
CALCIUM BLD-MCNC: 9.1 MG/DL (ref 8.5–10.1)
CHLORIDE SERPL-SCNC: 102 MMOL/L (ref 98–112)
CHOLEST SMN-MCNC: 269 MG/DL (ref ?–200)
CO2 SERPL-SCNC: 28 MMOL/L (ref 21–32)
CREAT BLD-MCNC: 0.78 MG/DL
DEPRECATED RDW RBC AUTO: 49 FL (ref 35.1–46.3)
EOSINOPHIL # BLD AUTO: 0.05 X10(3) UL (ref 0–0.7)
EOSINOPHIL NFR BLD AUTO: 1.1 %
ERYTHROCYTE [DISTWIDTH] IN BLOOD BY AUTOMATED COUNT: 14.6 % (ref 11–15)
GLOBULIN PLAS-MCNC: 3.4 G/DL (ref 2.8–4.4)
GLUCOSE BLD-MCNC: 84 MG/DL (ref 70–99)
HCT VFR BLD AUTO: 44.7 %
HDLC SERPL-MCNC: 68 MG/DL (ref 40–59)
HGB BLD-MCNC: 14.6 G/DL
IMM GRANULOCYTES # BLD AUTO: 0.01 X10(3) UL (ref 0–1)
IMM GRANULOCYTES NFR BLD: 0.2 %
LDLC SERPL CALC-MCNC: 184 MG/DL (ref ?–100)
LYMPHOCYTES # BLD AUTO: 1.4 X10(3) UL (ref 1–4)
LYMPHOCYTES NFR BLD AUTO: 30.3 %
M PROTEIN MFR SERPL ELPH: 7.4 G/DL (ref 6.4–8.2)
MCH RBC QN AUTO: 29.8 PG (ref 26–34)
MCHC RBC AUTO-ENTMCNC: 32.7 G/DL (ref 31–37)
MCV RBC AUTO: 91.2 FL
MONOCYTES # BLD AUTO: 0.36 X10(3) UL (ref 0.1–1)
MONOCYTES NFR BLD AUTO: 7.8 %
NEUTROPHILS # BLD AUTO: 2.75 X10 (3) UL (ref 1.5–7.7)
NEUTROPHILS # BLD AUTO: 2.75 X10(3) UL (ref 1.5–7.7)
NEUTROPHILS NFR BLD AUTO: 59.5 %
NONHDLC SERPL-MCNC: 201 MG/DL (ref ?–130)
OSMOLALITY SERPL CALC.SUM OF ELEC: 282 MOSM/KG (ref 275–295)
PATIENT FASTING Y/N/NP: YES
PATIENT FASTING Y/N/NP: YES
PLATELET # BLD AUTO: 274 10(3)UL (ref 150–450)
POTASSIUM SERPL-SCNC: 3.9 MMOL/L (ref 3.5–5.1)
RBC # BLD AUTO: 4.9 X10(6)UL
SODIUM SERPL-SCNC: 136 MMOL/L (ref 136–145)
T4 FREE SERPL-MCNC: 1.1 NG/DL (ref 0.8–1.7)
TRIGL SERPL-MCNC: 85 MG/DL (ref 30–149)
TSI SER-ACNC: 1.08 MIU/ML (ref 0.36–3.74)
VLDLC SERPL CALC-MCNC: 17 MG/DL (ref 0–30)
WBC # BLD AUTO: 4.6 X10(3) UL (ref 4–11)

## 2021-01-13 PROCEDURE — 85025 COMPLETE CBC W/AUTO DIFF WBC: CPT

## 2021-01-13 PROCEDURE — 36415 COLL VENOUS BLD VENIPUNCTURE: CPT

## 2021-01-13 PROCEDURE — 84443 ASSAY THYROID STIM HORMONE: CPT

## 2021-01-13 PROCEDURE — 80053 COMPREHEN METABOLIC PANEL: CPT

## 2021-01-13 PROCEDURE — 80061 LIPID PANEL: CPT

## 2021-01-13 PROCEDURE — 84439 ASSAY OF FREE THYROXINE: CPT

## 2021-01-27 ENCOUNTER — TELEPHONE (OUTPATIENT)
Dept: FAMILY MEDICINE CLINIC | Facility: CLINIC | Age: 71
End: 2021-01-27

## 2021-01-27 NOTE — TELEPHONE ENCOUNTER
----- Message from Radha Ramey DO sent at 1/26/2021  7:07 PM CST -----  Please call patient and instruct patient to make an appointment to see me for follow-up on her very high persistently elevated cholesterol.

## 2021-02-03 ENCOUNTER — HOSPITAL ENCOUNTER (OUTPATIENT)
Dept: MAMMOGRAPHY | Age: 71
Discharge: HOME | End: 2021-02-03
Attending: FAMILY MEDICINE
Payer: MEDICARE

## 2021-02-03 DIAGNOSIS — Z12.31 ENCOUNTER FOR SCREENING MAMMOGRAM FOR MALIGNANT NEOPLASM OF BREAST: ICD-10-CM

## 2021-02-03 PROCEDURE — 77067 SCR MAMMO BI INCL CAD: CPT | Performed by: FAMILY MEDICINE

## 2021-02-03 PROCEDURE — 77063 BREAST TOMOSYNTHESIS BI: CPT | Performed by: FAMILY MEDICINE

## 2021-02-17 ENCOUNTER — OFFICE VISIT (OUTPATIENT)
Dept: FAMILY MEDICINE CLINIC | Facility: CLINIC | Age: 71
End: 2021-02-17
Payer: MEDICARE

## 2021-02-17 VITALS
HEIGHT: 60.87 IN | HEART RATE: 72 BPM | DIASTOLIC BLOOD PRESSURE: 70 MMHG | SYSTOLIC BLOOD PRESSURE: 146 MMHG | BODY MASS INDEX: 23.98 KG/M2 | OXYGEN SATURATION: 98 % | TEMPERATURE: 97 F | WEIGHT: 127 LBS

## 2021-02-17 DIAGNOSIS — Z82.49 FAMILY HISTORY OF PREMATURE CAD: ICD-10-CM

## 2021-02-17 DIAGNOSIS — Z76.89 ENCOUNTER FOR NEW MEDICATION PRESCRIPTION: ICD-10-CM

## 2021-02-17 DIAGNOSIS — I10 ESSENTIAL HYPERTENSION: ICD-10-CM

## 2021-02-17 DIAGNOSIS — E78.00 HYPERCHOLESTEROLEMIA: Primary | ICD-10-CM

## 2021-02-17 PROCEDURE — 99214 OFFICE O/P EST MOD 30 MIN: CPT | Performed by: FAMILY MEDICINE

## 2021-02-17 RX ORDER — ERYTHROMYCIN 5 MG/G
OINTMENT OPHTHALMIC
COMMUNITY
Start: 2020-12-14 | End: 2021-02-17 | Stop reason: ALTCHOICE

## 2021-02-17 RX ORDER — ROSUVASTATIN CALCIUM 5 MG/1
5 TABLET, COATED ORAL NIGHTLY
Qty: 30 TABLET | Refills: 3 | Status: SHIPPED | OUTPATIENT
Start: 2021-02-17 | End: 2021-06-14

## 2021-02-17 NOTE — PROGRESS NOTES
Ileana Settler is a 66year old female. HPI:       Hypercholesterolemia:    Mother had fatal MI age 60 yo. Father had arteriosclerosis, passed away age 67 yo. Hypertension:  Uncontrolled.   Patient taking hydrochlorothiazide 12.5 mg p.o. every m denies any unusual skin lesions or rashes   RESPIRATORY: Denies: GERMAN/MENDIOLA/Cough/Wheeze  CARDIOVASCULAR: Denies CP/palpitations  VASCULAR: Denies LE edema, denies claudication type symptoms  GI: Denies abdominal pain/nausea/vomiting/blood in stool/black stoo Glucose      70 - 99 mg/dL 84 96 93   Sodium      136 - 145 mmol/L 136 136 139   Potassium      3.5 - 5.1 mmol/L 3.9 4.3 4.0   Chloride      98 - 112 mmol/L 102 102 106   Carbon Dioxide, Total      21.0 - 32.0 mmol/L 28.0 29.0 27.0   ANION GAP      0 - 1 Rosuvastatin Calcium 5 MG Oral Tab; Take 1 tablet (5 mg total) by mouth nightly. Dispense: 30 tablet; Refill: 3  - HEPATIC FUNCTION PANEL (7); Future  - LIPID PANEL; Future    2.  Encounter for new medication prescription  Medication use, risks, benefits,

## 2021-02-17 NOTE — PATIENT INSTRUCTIONS
Understanding Your Cholesterol Numbers     Blood flows easily when arteries are clear. Less blood flows when cholesterol builds up in artery walls.    The higher your blood cholesterol, the greater your risk for heart attack, cardiovascular disease,  Total cholesterol is just one part of the story. Cholesterol is made up of different kinds of fats (lipids). If your total cholesterol is high, knowing your lipid profile is important. The 2 most important lipids are HDL and LDL.  Your healthcare provider w High cholesterol is only one of the big risk factor for heart disease heart attack, stroke, and peripheral artery disease. If your cholesterol levels are higher than normal, your healthcare provider will help you with steps to take to lower your levels.  St Cholesterol is a waxy substance. It travels in your blood through the blood vessels. When you have high cholesterol, it can build up along the walls of the blood vessels. This makes the vessels narrower and decreases blood flow.  You are then at greater ris · Eat about two, 3.5 ounce servings of non-fried fish such as salmon, herring, sardines or mackerel per week . Most fish contain omega-3 fatty acids. These help lower total blood cholesterol.  Omega-3 fatty acids lowers triglyceride levels, another form of © 4403-8213 [employer] [address]. All rights reserved. This information is not intended as a substitute for professional medical care. Always follow your healthcare professional's instructions.         Lifestyle Changes to Control Cholesterol  You can Your healthcare provider may recommend that you get more physical activity if you haven't been active. Your provider may recommend that you get moderate to vigorous physical activity for at least 40 minutes each day.  You should do this for at least 3 to 4 Healthy eating and exercise are a good start to keeping your cholesterol down. But you may need some extra help from medicine. If your doctor prescribes medicine, be sure to take it exactly as directed.  Remember:   · Tell your healthcare provider about all If you are uncertain about your risk factors, you and your provider may decide to proceed with a scan to determine a coronary artery calcium (CAC) score.  Depending on the results of this scan you and your provider may decide on whether starting a medicine

## 2021-02-18 ENCOUNTER — TELEPHONE (OUTPATIENT)
Dept: FAMILY MEDICINE CLINIC | Facility: CLINIC | Age: 71
End: 2021-02-18

## 2021-02-18 NOTE — TELEPHONE ENCOUNTER
Riley Ernst is calling because she seen Dr Samual Pallas yesterday, she was put on a Cholesterol medication, but Riley Ernst does not want to take it because of the side effect that could cause diabeties, she was boarder line diabetic when she was pregnant and she is prem

## 2021-02-18 NOTE — TELEPHONE ENCOUNTER
Relayed recommendations. States she will research the atorvastatin and get back to us. States she will continue on Rosuvastatin for now.

## 2021-02-18 NOTE — TELEPHONE ENCOUNTER
This patient has significantly elevated LDL and those statins are too weak to be effective. Recommend atorvastatin 10 mg nightly, #30, 3 refills.

## 2021-02-18 NOTE — TELEPHONE ENCOUNTER
Relayed recommendations. Patient states she did some research and would like something else prescribed. States she read ZOCOR and PRAVACHOL have a lesser chance of causing diabetes/elevated blood sugar.  She said she is also willing to try anything else adalberto

## 2021-02-18 NOTE — TELEPHONE ENCOUNTER
Please inform patient that I strongly recommend that she start taking the rosuvastatin as prescribed. Inform patient further that diabetes is a rare side effect and that she is unlikely to develop diabetes from taking rosuvastatin.   We routinely monitor l

## 2021-03-09 ENCOUNTER — PATIENT MESSAGE (OUTPATIENT)
Dept: FAMILY MEDICINE CLINIC | Facility: CLINIC | Age: 71
End: 2021-03-09

## 2021-03-17 ENCOUNTER — OFFICE VISIT (OUTPATIENT)
Dept: FAMILY MEDICINE CLINIC | Facility: CLINIC | Age: 71
End: 2021-03-17
Payer: MEDICARE

## 2021-03-17 VITALS
SYSTOLIC BLOOD PRESSURE: 130 MMHG | HEIGHT: 60 IN | HEART RATE: 78 BPM | OXYGEN SATURATION: 98 % | WEIGHT: 127 LBS | DIASTOLIC BLOOD PRESSURE: 64 MMHG | BODY MASS INDEX: 24.94 KG/M2 | TEMPERATURE: 97 F

## 2021-03-17 DIAGNOSIS — I10 ESSENTIAL HYPERTENSION: Primary | ICD-10-CM

## 2021-03-17 DIAGNOSIS — E78.00 HYPERCHOLESTEROLEMIA: ICD-10-CM

## 2021-03-17 PROCEDURE — 99213 OFFICE O/P EST LOW 20 MIN: CPT | Performed by: FAMILY MEDICINE

## 2021-03-17 NOTE — PROGRESS NOTES
Roxi Zacarias is a 66year old female. HPI:       Hypertension:  Blood pressure spuriously elevated at last office visit. Patient taking hydrochlorothiazide 12.5 mg p.o. every morning, tolerating well, no side effects noticed.   Takes medication every • Cancer Maternal Grandmother    • Stroke Maternal Grandfather    • Heart Attack Paternal Grandfather      REVIEW OF SYSTEMS:   GENERAL HEALTH: overall feels well, no fever  RESPIRATORY: Denies: GERMAN/MENDIOLA/Cough  CARDIOVASCULAR: Denies CP/palpitations  VASC hydrochlorothiazide 12.5 mg p.o. every morning. Consider increase hydrochlorothiazide to 25 mg p.o. every morning at follow-up visit after recheck of lipid panel.     2. Hypercholesterolemia  Patient taking rosuvastatin 5 mg nightly which was initiated at

## 2021-03-29 ENCOUNTER — LAB ENCOUNTER (OUTPATIENT)
Dept: LAB | Age: 71
End: 2021-03-29
Attending: FAMILY MEDICINE
Payer: MEDICARE

## 2021-03-29 DIAGNOSIS — E78.00 HYPERCHOLESTEROLEMIA: ICD-10-CM

## 2021-03-29 PROCEDURE — 80076 HEPATIC FUNCTION PANEL: CPT

## 2021-03-29 PROCEDURE — 36415 COLL VENOUS BLD VENIPUNCTURE: CPT

## 2021-06-04 ENCOUNTER — LAB ENCOUNTER (OUTPATIENT)
Dept: LAB | Age: 71
End: 2021-06-04
Attending: FAMILY MEDICINE
Payer: MEDICARE

## 2021-06-04 DIAGNOSIS — E78.00 HYPERCHOLESTEROLEMIA: ICD-10-CM

## 2021-06-04 PROCEDURE — 80061 LIPID PANEL: CPT

## 2021-06-04 PROCEDURE — 36415 COLL VENOUS BLD VENIPUNCTURE: CPT

## 2021-06-14 DIAGNOSIS — E78.00 HYPERCHOLESTEROLEMIA: ICD-10-CM

## 2021-06-14 DIAGNOSIS — Z76.89 ENCOUNTER FOR NEW MEDICATION PRESCRIPTION: ICD-10-CM

## 2021-06-14 RX ORDER — ROSUVASTATIN CALCIUM 5 MG/1
5 TABLET, COATED ORAL NIGHTLY
Qty: 90 TABLET | Refills: 3 | Status: SHIPPED | OUTPATIENT
Start: 2021-06-14

## 2021-06-14 RX ORDER — ROSUVASTATIN CALCIUM 5 MG/1
5 TABLET, COATED ORAL NIGHTLY
Qty: 30 TABLET | Refills: 3 | Status: CANCELLED | OUTPATIENT
Start: 2021-06-14

## 2021-06-14 NOTE — TELEPHONE ENCOUNTER
Gabby Olivas is calling to get the results of her labwork that she did back on June 4th, she also is needing a refill on her Rosuvastatin Calcium 5 MG Oral Tab that the pharmacy had denied.  Please call Gabby Olivas at [phone]

## 2021-06-15 NOTE — TELEPHONE ENCOUNTER
Patient's LDL, AKA bad cholesterol, greatly improved on rosuvastatin 5 mg nightly which patient should continue. Refill sent for the rosuvastatin.   Please have patient make an appointment to see me for follow-up in the office on her hypercholesterolemia a

## 2021-07-02 ENCOUNTER — OFFICE VISIT (OUTPATIENT)
Dept: FAMILY MEDICINE CLINIC | Facility: CLINIC | Age: 71
End: 2021-07-02
Payer: MEDICARE

## 2021-07-02 VITALS
WEIGHT: 125 LBS | TEMPERATURE: 98 F | HEIGHT: 60.87 IN | DIASTOLIC BLOOD PRESSURE: 74 MMHG | SYSTOLIC BLOOD PRESSURE: 126 MMHG | OXYGEN SATURATION: 99 % | BODY MASS INDEX: 23.6 KG/M2 | HEART RATE: 78 BPM

## 2021-07-02 DIAGNOSIS — Z79.899 ENCOUNTER FOR LONG-TERM CURRENT USE OF MEDICATION: ICD-10-CM

## 2021-07-02 DIAGNOSIS — I10 ESSENTIAL HYPERTENSION: ICD-10-CM

## 2021-07-02 DIAGNOSIS — E78.00 HYPERCHOLESTEROLEMIA: Primary | ICD-10-CM

## 2021-07-02 PROCEDURE — 99213 OFFICE O/P EST LOW 20 MIN: CPT | Performed by: FAMILY MEDICINE

## 2021-07-02 NOTE — PROGRESS NOTES
Tommy Matamoros is a 66year old female. HPI:     HTN:    Stable. Severity is mild, patient is on one agent to control her hypertension, hydrochlorothiazide 12.5 mg p.o. every morning.   Pt has been taking medications as instructed, no medication side Heart Attack Paternal Grandfather      REVIEW OF SYSTEMS:   GENERAL HEALTH: overall feels well, no fever, no change in weight  RESPIRATORY: Denies: GERMAN/MENDIOLA/Cough  CARDIOVASCULAR: Denies CP/palpitations  VASCULAR: Denies LE edema  NEURO: denies headaches  P Cholesterol Calc      <100 mg/dL 78 184 (H) 184 (H)   VLDL      0 - 30 mg/dL 13 17 26   NON HDL CHOL      <130 mg/dL 94 201 (H) 210 (H)   Patient Fasting?        Yes Yes Yes         ASSESSMENT AND PLAN:       Hypercholesterolemia  (primary encounter diagnos

## 2022-01-14 ENCOUNTER — OFFICE VISIT (OUTPATIENT)
Dept: FAMILY MEDICINE CLINIC | Facility: CLINIC | Age: 72
End: 2022-01-14
Payer: MEDICARE

## 2022-01-14 VITALS
TEMPERATURE: 98 F | HEART RATE: 60 BPM | DIASTOLIC BLOOD PRESSURE: 80 MMHG | HEIGHT: 61 IN | SYSTOLIC BLOOD PRESSURE: 140 MMHG | BODY MASS INDEX: 23.98 KG/M2 | OXYGEN SATURATION: 99 % | WEIGHT: 127 LBS

## 2022-01-14 DIAGNOSIS — Z53.20 COLONOSCOPY REFUSED: ICD-10-CM

## 2022-01-14 DIAGNOSIS — Z11.59 NEED FOR HEPATITIS C SCREENING TEST: ICD-10-CM

## 2022-01-14 DIAGNOSIS — Z78.0 POSTMENOPAUSAL: ICD-10-CM

## 2022-01-14 DIAGNOSIS — Z00.00 MEDICARE ANNUAL WELLNESS VISIT, SUBSEQUENT: Primary | ICD-10-CM

## 2022-01-14 DIAGNOSIS — Z79.899 ENCOUNTER FOR LONG-TERM CURRENT USE OF MEDICATION: ICD-10-CM

## 2022-01-14 DIAGNOSIS — I10 ESSENTIAL HYPERTENSION: ICD-10-CM

## 2022-01-14 DIAGNOSIS — Z12.31 ENCOUNTER FOR SCREENING MAMMOGRAM FOR MALIGNANT NEOPLASM OF BREAST: ICD-10-CM

## 2022-01-14 DIAGNOSIS — M81.0 AGE-RELATED OSTEOPOROSIS WITHOUT CURRENT PATHOLOGICAL FRACTURE: ICD-10-CM

## 2022-01-14 DIAGNOSIS — E78.00 HYPERCHOLESTEROLEMIA: ICD-10-CM

## 2022-01-14 PROCEDURE — 99214 OFFICE O/P EST MOD 30 MIN: CPT | Performed by: FAMILY MEDICINE

## 2022-01-14 PROCEDURE — G0439 PPPS, SUBSEQ VISIT: HCPCS | Performed by: FAMILY MEDICINE

## 2022-01-14 RX ORDER — HYDROCHLOROTHIAZIDE 12.5 MG/1
12.5 CAPSULE, GELATIN COATED ORAL EVERY MORNING
Qty: 90 CAPSULE | Refills: 1 | Status: SHIPPED | OUTPATIENT
Start: 2022-01-14

## 2022-01-14 NOTE — PROGRESS NOTES
HPI:   Sammi Haas is a 66year old female who presents for a Medicare Subsequent Annual Wellness visit (Pt already had Initial Annual Wellness), hypertension, hyperlipidemia, and osteoporosis. No new complaints.        Fall/Risk Assessment abnormal CAD     Colonoscopy refused    Wt Readings from Last 3 Encounters:  01/14/22 : 127 lb (57.6 kg)  07/02/21 : 125 lb (56.7 kg)  03/17/21 : 127 lb (57.6 kg)     Last Cholesterol Labs:   Lab Results   Component Value Date    CHOLEST 164 06/04/2021    HDL 70 (H vision  HEENT: denies nasal congestion, sinus pain or ST  LUNGS: denies shortness of breath with exertion  CARDIOVASCULAR: denies chest pain on exertion  GI: denies abdominal pain, denies heartburn  : denies dysuria, vaginal discharge or itching, no comp Immunization History   Administered Date(s) Administered   • Covid-19 Vaccine Moderna 100 mcg/0.5 ml 03/02/2021, 03/30/2021, 11/18/2021   • FLU VAC High Dose 65 YRS & Older PRSV Free (71750) 10/04/2019, 10/16/2020, 10/27/2021   • FLUAD High Dose 72 yr total) by mouth every morning. Dispense: 90 capsule; Refill: 1  - CBC WITH DIFFERENTIAL WITH PLATELET; Future  - COMP METABOLIC PANEL (14); Future  - ASSAY, THYROID STIM HORMONE; Future  - FREE T4 (FREE THYROXINE); Future    6.  Hypercholesterolemia  Impro screening test  -     HCV ANTIBODY; Future    Colonoscopy refused    Essential hypertension  -     hydroCHLOROthiazide 12.5 MG Oral Cap; Take 1 capsule (12.5 mg total) by mouth every morning.  -     CBC WITH DIFFERENTIAL WITH PLATELET;  Future  -     COMP M Diabetes Screening    Fasting Blood Sugar /  Glucose    One screening every 12 months if never tested or if previously tested but not diagnosed with pre-diabetes   One screening every 6 months if diagnosed with pre-diabetes Lab Results   Component Value and older    One baseline mammogram covered for patients aged 32-38 02/03/2021    Mammogram due on 02/03/2022    Immunizations    Influenza Covered once per flu season  Please get every year 10/27/2021  No recommendations at this time    Pneumococcal Each

## 2022-01-14 NOTE — PATIENT INSTRUCTIONS
Adriana Crystal's SCREENING SCHEDULE   Tests on this list are recommended by your physician but may not be covered, or covered at this frequency, by your insurer. Please check with your insurance carrier before scheduling to verify coverage.    SABA recommendations at this time   Pap and Pelvic    Pap   Covered every 2 years for women at normal risk;  Annually if at high risk -  No recommendations at this time    Chlamydia Annually if high risk -  No recommendations at this time   Screening Mammogram Dept of Public Health. This site has a lot of good information including definitions of the different types of Advance Directives. It also has the State forms available on it's website for anyone to review and print using their home computer and printer.  (

## 2022-01-28 ENCOUNTER — LAB ENCOUNTER (OUTPATIENT)
Dept: LAB | Age: 72
End: 2022-01-28
Attending: FAMILY MEDICINE
Payer: MEDICARE

## 2022-01-28 DIAGNOSIS — Z11.59 NEED FOR HEPATITIS C SCREENING TEST: ICD-10-CM

## 2022-01-28 DIAGNOSIS — E78.00 HYPERCHOLESTEROLEMIA: ICD-10-CM

## 2022-01-28 DIAGNOSIS — I10 ESSENTIAL HYPERTENSION: ICD-10-CM

## 2022-01-28 LAB
ALBUMIN SERPL-MCNC: 4.1 G/DL (ref 3.4–5)
ALBUMIN/GLOB SERPL: 1.3 {RATIO} (ref 1–2)
ALP LIVER SERPL-CCNC: 63 U/L
ALT SERPL-CCNC: 39 U/L
ANION GAP SERPL CALC-SCNC: 5 MMOL/L (ref 0–18)
AST SERPL-CCNC: 17 U/L (ref 15–37)
BASOPHILS # BLD AUTO: 0.02 X10(3) UL (ref 0–0.2)
BASOPHILS NFR BLD AUTO: 0.5 %
BILIRUB SERPL-MCNC: 0.7 MG/DL (ref 0.1–2)
BUN BLD-MCNC: 14 MG/DL (ref 7–18)
CALCIUM BLD-MCNC: 9.8 MG/DL (ref 8.5–10.1)
CHLORIDE SERPL-SCNC: 101 MMOL/L (ref 98–112)
CHOLEST SERPL-MCNC: 167 MG/DL (ref ?–200)
CO2 SERPL-SCNC: 32 MMOL/L (ref 21–32)
CREAT BLD-MCNC: 0.73 MG/DL
EOSINOPHIL # BLD AUTO: 0.06 X10(3) UL (ref 0–0.7)
EOSINOPHIL NFR BLD AUTO: 1.5 %
ERYTHROCYTE [DISTWIDTH] IN BLOOD BY AUTOMATED COUNT: 14.2 %
FASTING PATIENT LIPID ANSWER: YES
GLOBULIN PLAS-MCNC: 3.1 G/DL (ref 2.8–4.4)
GLUCOSE BLD-MCNC: 103 MG/DL (ref 70–99)
HCT VFR BLD AUTO: 43.9 %
HCV AB SERPL QL IA: NONREACTIVE
HDLC SERPL-MCNC: 71 MG/DL (ref 40–59)
HGB BLD-MCNC: 14.4 G/DL
IMM GRANULOCYTES # BLD AUTO: 0 X10(3) UL (ref 0–1)
IMM GRANULOCYTES NFR BLD: 0 %
LDLC SERPL CALC-MCNC: 80 MG/DL (ref ?–100)
LYMPHOCYTES # BLD AUTO: 1.25 X10(3) UL (ref 1–4)
LYMPHOCYTES NFR BLD AUTO: 30.6 %
MCH RBC QN AUTO: 29.8 PG (ref 26–34)
MCHC RBC AUTO-ENTMCNC: 32.8 G/DL (ref 31–37)
MCV RBC AUTO: 90.9 FL
MONOCYTES # BLD AUTO: 0.33 X10(3) UL (ref 0.1–1)
MONOCYTES NFR BLD AUTO: 8.1 %
NEUTROPHILS # BLD AUTO: 2.42 X10 (3) UL (ref 1.5–7.7)
NEUTROPHILS # BLD AUTO: 2.42 X10(3) UL (ref 1.5–7.7)
NEUTROPHILS NFR BLD AUTO: 59.3 %
NONHDLC SERPL-MCNC: 96 MG/DL (ref ?–130)
OSMOLALITY SERPL CALC.SUM OF ELEC: 287 MOSM/KG (ref 275–295)
PLATELET # BLD AUTO: 294 10(3)UL (ref 150–450)
POTASSIUM SERPL-SCNC: 4.1 MMOL/L (ref 3.5–5.1)
RBC # BLD AUTO: 4.83 X10(6)UL
SODIUM SERPL-SCNC: 138 MMOL/L (ref 136–145)
T4 FREE SERPL-MCNC: 1 NG/DL (ref 0.8–1.7)
TRIGL SERPL-MCNC: 85 MG/DL (ref 30–149)
TSI SER-ACNC: 1.14 MIU/ML (ref 0.36–3.74)
VLDLC SERPL CALC-MCNC: 13 MG/DL (ref 0–30)
WBC # BLD AUTO: 4.1 X10(3) UL (ref 4–11)

## 2022-01-28 PROCEDURE — 80061 LIPID PANEL: CPT

## 2022-01-28 PROCEDURE — 80053 COMPREHEN METABOLIC PANEL: CPT

## 2022-01-28 PROCEDURE — 84443 ASSAY THYROID STIM HORMONE: CPT

## 2022-01-28 PROCEDURE — 86803 HEPATITIS C AB TEST: CPT

## 2022-01-28 PROCEDURE — 85025 COMPLETE CBC W/AUTO DIFF WBC: CPT

## 2022-01-28 PROCEDURE — 84439 ASSAY OF FREE THYROXINE: CPT

## 2022-01-28 PROCEDURE — 36415 COLL VENOUS BLD VENIPUNCTURE: CPT

## 2022-02-12 ENCOUNTER — HOSPITAL ENCOUNTER (OUTPATIENT)
Dept: BONE DENSITY | Age: 72
Discharge: HOME | End: 2022-02-12
Attending: FAMILY MEDICINE
Payer: MEDICARE

## 2022-02-12 ENCOUNTER — HOSPITAL ENCOUNTER (OUTPATIENT)
Dept: MAMMOGRAPHY | Age: 72
Discharge: HOME | End: 2022-02-12
Attending: FAMILY MEDICINE
Payer: MEDICARE

## 2022-02-12 DIAGNOSIS — Z78.0 POSTMENOPAUSAL: ICD-10-CM

## 2022-02-12 DIAGNOSIS — Z12.31 ENCOUNTER FOR SCREENING MAMMOGRAM FOR MALIGNANT NEOPLASM OF BREAST: ICD-10-CM

## 2022-02-12 DIAGNOSIS — M81.0 AGE-RELATED OSTEOPOROSIS WITHOUT CURRENT PATHOLOGICAL FRACTURE: ICD-10-CM

## 2022-02-12 PROCEDURE — 77067 SCR MAMMO BI INCL CAD: CPT | Performed by: FAMILY MEDICINE

## 2022-02-12 PROCEDURE — 77080 DXA BONE DENSITY AXIAL: CPT | Performed by: FAMILY MEDICINE

## 2022-02-12 PROCEDURE — 77063 BREAST TOMOSYNTHESIS BI: CPT | Performed by: FAMILY MEDICINE

## 2022-03-04 ENCOUNTER — TELEPHONE (OUTPATIENT)
Dept: FAMILY MEDICINE CLINIC | Facility: CLINIC | Age: 72
End: 2022-03-04

## 2022-03-04 ENCOUNTER — OFFICE VISIT (OUTPATIENT)
Dept: FAMILY MEDICINE CLINIC | Facility: CLINIC | Age: 72
End: 2022-03-04
Payer: MEDICARE

## 2022-03-04 VITALS
HEART RATE: 78 BPM | WEIGHT: 126 LBS | OXYGEN SATURATION: 98 % | BODY MASS INDEX: 23.79 KG/M2 | DIASTOLIC BLOOD PRESSURE: 67 MMHG | SYSTOLIC BLOOD PRESSURE: 120 MMHG | HEIGHT: 60.87 IN

## 2022-03-04 DIAGNOSIS — Z13.6 SCREENING FOR HEART DISEASE: ICD-10-CM

## 2022-03-04 DIAGNOSIS — M81.0 POSTMENOPAUSAL OSTEOPOROSIS: ICD-10-CM

## 2022-03-04 DIAGNOSIS — E78.00 HYPERCHOLESTEROLEMIA: ICD-10-CM

## 2022-03-04 DIAGNOSIS — I10 ESSENTIAL HYPERTENSION: Primary | ICD-10-CM

## 2022-03-04 DIAGNOSIS — R73.01 IMPAIRED FASTING GLUCOSE: ICD-10-CM

## 2022-03-04 LAB
CARTRIDGE LOT#: 881 NUMERIC
HEMOGLOBIN A1C: 5.5 % (ref 4.3–5.6)

## 2022-03-04 PROCEDURE — 99214 OFFICE O/P EST MOD 30 MIN: CPT | Performed by: FAMILY MEDICINE

## 2022-03-04 PROCEDURE — 83036 HEMOGLOBIN GLYCOSYLATED A1C: CPT | Performed by: FAMILY MEDICINE

## 2022-03-04 RX ORDER — PHENOL 1.4 %
600 AEROSOL, SPRAY (ML) MUCOUS MEMBRANE 2 TIMES DAILY
COMMUNITY

## 2022-03-04 RX ORDER — BIOTIN 1 MG
1 TABLET ORAL DAILY
COMMUNITY

## 2022-03-04 NOTE — TELEPHONE ENCOUNTER
Patient calling with an update for Dr Haylee Martinez she said she is taking 1000 mg of vitamin D. Is she to continue taking 1000 or does doctor want her to take 2000?

## 2022-03-04 NOTE — TELEPHONE ENCOUNTER
Vitamin D has been in normal range as below, therefore recommend patient continue with the 1000 units of vitamin D daily. She can take either a combination calcium and vitamin D supplement such as Citracal, Caltrate, or Viactiv twice a day in addition to the vitamin D or she can take plain calcium supplement 600 mg twice a day with meals.

## 2022-03-06 PROBLEM — M81.0 POSTMENOPAUSAL OSTEOPOROSIS: Status: ACTIVE | Noted: 2022-03-06

## 2022-05-17 ENCOUNTER — TELEPHONE (OUTPATIENT)
Dept: ORTHOPEDICS CLINIC | Facility: CLINIC | Age: 72
End: 2022-05-17

## 2022-05-17 DIAGNOSIS — M25.561 RIGHT KNEE PAIN, UNSPECIFIED CHRONICITY: Primary | ICD-10-CM

## 2022-05-17 NOTE — TELEPHONE ENCOUNTER
Reviewed patients chart, xray orders are required. Order placed for rt knee xrays.  Please contact patient advise to arrive 30 mins prior to patients appt to complete x-ray order and schedule patients xray appt-Thank you  Future Appointments   Date Time Provider Alta Bridget   7/13/2022 10:20 AM Jana Littlejohn MD [facility]   7/15/2022  9:30 AM Aye Elise DO [facility]

## 2022-06-08 ENCOUNTER — LAB ENCOUNTER (OUTPATIENT)
Dept: LAB | Age: 72
End: 2022-06-08
Attending: FAMILY MEDICINE
Payer: MEDICARE

## 2022-06-08 DIAGNOSIS — E78.00 HYPERCHOLESTEROLEMIA: ICD-10-CM

## 2022-06-08 LAB
CHOLEST SERPL-MCNC: 214 MG/DL (ref ?–200)
FASTING PATIENT LIPID ANSWER: YES
HDLC SERPL-MCNC: 70 MG/DL (ref 40–59)
LDLC SERPL CALC-MCNC: 128 MG/DL (ref ?–100)
NONHDLC SERPL-MCNC: 144 MG/DL (ref ?–130)
TRIGL SERPL-MCNC: 91 MG/DL (ref 30–149)
VLDLC SERPL CALC-MCNC: 16 MG/DL (ref 0–30)

## 2022-06-08 PROCEDURE — 80061 LIPID PANEL: CPT

## 2022-06-08 PROCEDURE — 36415 COLL VENOUS BLD VENIPUNCTURE: CPT

## 2022-06-10 ENCOUNTER — TELEPHONE (OUTPATIENT)
Dept: FAMILY MEDICINE CLINIC | Facility: CLINIC | Age: 72
End: 2022-06-10

## 2022-06-10 NOTE — TELEPHONE ENCOUNTER
Pt called had Lipid panel done 6/8/22  And was wondering if she should have a sooner appt  I told her probably not because Dr. Garrett Strong has no avail June appts.  Told pt we would call if we need to see her sooner and just to keep July appt

## 2022-07-13 ENCOUNTER — OFFICE VISIT (OUTPATIENT)
Dept: ORTHOPEDICS CLINIC | Facility: CLINIC | Age: 72
End: 2022-07-13
Payer: MEDICARE

## 2022-07-13 ENCOUNTER — HOSPITAL ENCOUNTER (OUTPATIENT)
Dept: GENERAL RADIOLOGY | Age: 72
Discharge: HOME | End: 2022-07-13
Attending: ORTHOPAEDIC SURGERY
Payer: MEDICARE

## 2022-07-13 VITALS — HEART RATE: 75 BPM | OXYGEN SATURATION: 97 % | BODY MASS INDEX: 30.38 KG/M2 | WEIGHT: 163 LBS | HEIGHT: 61.5 IN

## 2022-07-13 DIAGNOSIS — M25.561 RIGHT KNEE PAIN, UNSPECIFIED CHRONICITY: ICD-10-CM

## 2022-07-13 DIAGNOSIS — M17.11 PRIMARY OSTEOARTHRITIS OF RIGHT KNEE: Primary | ICD-10-CM

## 2022-07-13 PROCEDURE — 99203 OFFICE O/P NEW LOW 30 MIN: CPT | Performed by: ORTHOPAEDIC SURGERY

## 2022-07-13 PROCEDURE — 73564 X-RAY EXAM KNEE 4 OR MORE: CPT | Performed by: ORTHOPAEDIC SURGERY

## 2022-07-13 PROCEDURE — 1126F AMNT PAIN NOTED NONE PRSNT: CPT | Performed by: ORTHOPAEDIC SURGERY

## 2022-07-15 ENCOUNTER — TELEPHONE (OUTPATIENT)
Dept: FAMILY MEDICINE CLINIC | Facility: CLINIC | Age: 72
End: 2022-07-15

## 2022-07-15 ENCOUNTER — OFFICE VISIT (OUTPATIENT)
Dept: FAMILY MEDICINE CLINIC | Facility: CLINIC | Age: 72
End: 2022-07-15
Payer: MEDICARE

## 2022-07-15 VITALS
WEIGHT: 120.19 LBS | RESPIRATION RATE: 18 BRPM | TEMPERATURE: 97 F | HEART RATE: 78 BPM | HEIGHT: 61.5 IN | DIASTOLIC BLOOD PRESSURE: 62 MMHG | OXYGEN SATURATION: 98 % | SYSTOLIC BLOOD PRESSURE: 120 MMHG | BODY MASS INDEX: 22.4 KG/M2

## 2022-07-15 DIAGNOSIS — M17.11 PRIMARY OSTEOARTHRITIS OF RIGHT KNEE: ICD-10-CM

## 2022-07-15 DIAGNOSIS — I10 ESSENTIAL HYPERTENSION: ICD-10-CM

## 2022-07-15 DIAGNOSIS — E78.00 HYPERCHOLESTEROLEMIA: Primary | ICD-10-CM

## 2022-07-15 DIAGNOSIS — Z79.899 ENCOUNTER FOR LONG-TERM CURRENT USE OF MEDICATION: ICD-10-CM

## 2022-07-15 PROCEDURE — 99214 OFFICE O/P EST MOD 30 MIN: CPT | Performed by: FAMILY MEDICINE

## 2022-07-15 PROCEDURE — 1126F AMNT PAIN NOTED NONE PRSNT: CPT | Performed by: FAMILY MEDICINE

## 2022-07-15 RX ORDER — HYDROCHLOROTHIAZIDE 12.5 MG/1
12.5 CAPSULE, GELATIN COATED ORAL EVERY MORNING
Qty: 90 CAPSULE | Refills: 1 | Status: SHIPPED | OUTPATIENT
Start: 2022-07-15

## 2022-07-15 NOTE — TELEPHONE ENCOUNTER
Patient requesting refill for BP medication.   She had an appt today and wanted to make sure her refill would be called in because she only has a few pills left

## 2022-07-18 ENCOUNTER — TELEPHONE (OUTPATIENT)
Dept: ORTHOPEDICS CLINIC | Facility: CLINIC | Age: 72
End: 2022-07-18

## 2022-07-18 NOTE — TELEPHONE ENCOUNTER
Patient would like to get a call back and set-up surgery schedule possibly around Sept.or Oct. Please advise. Thanks.     Patient can be reached at [phone]

## 2022-07-19 ENCOUNTER — DOCUMENTATION ONLY (OUTPATIENT)
Dept: ORTHOPEDICS CLINIC | Facility: CLINIC | Age: 72
End: 2022-07-19

## 2022-07-19 ENCOUNTER — TELEPHONE (OUTPATIENT)
Dept: ORTHOPEDICS CLINIC | Facility: CLINIC | Age: 72
End: 2022-07-19

## 2022-07-19 DIAGNOSIS — M17.11 PRIMARY OSTEOARTHRITIS OF RIGHT KNEE: Primary | ICD-10-CM

## 2022-07-19 DIAGNOSIS — M17.11 ARTHRITIS OF RIGHT KNEE: Primary | ICD-10-CM

## 2022-07-19 NOTE — PROGRESS NOTES
Surgery Scheduling Sheet for Total Knee Arthroplasty  Gladis Yarbrough  1/18/1950    Procedure: Right total knee arthroplasty  Diagnosis: Right knee osteoarthritis   CPT Code: 47384  Anesthesia: Choice  Length of Surgery: 2 hours  Instruments: Medacta TKA   Assist: If case scheduled on Thurs/Fri, then Tono Robb first assist.  If case scheduled on Tues, then Gabriela Huang ([phone]) first assist. If Sharon Walker unavailable, then contact Yadira Kelly ([phone]) for first assist. If Sharon Walker and Juan Iniguez unavailable, then contact [facility] for first assist.    TESTING NEEDED  PAT: MRSA/MSSA, Covid, CBC, CMP, INR, PTT, T&S  Medical Clearance: Yes  Cardiac Clearance: No    Dental Clearance: No      MEDICAL HISTORY   Pacemaker:   Vernida Few Allergy:   Latex Allergy:   Anticoagulants:   Medication Allergy:   Pre-Op sent to PCP:     Case Number:  Authorization Number:   PA Completed:   Home Health Order:   Care Partner:     Date of Surgery:   Post op Follow up:     Lucius Mendez MD, [address] Orthopedic Surgery  Phone [phone]  Fax [phone]

## 2022-07-19 NOTE — TELEPHONE ENCOUNTER
Surgeon: Dr. Hasmukh Camejo    Date of Surgery: 10/4        Post Op Appt: 10/19 @ 10AM     Prior Authorization:   Inpatient or Outpatient: Inpatient  Facility: [facility]  Work Comp: NO  CPT: 52780  DX: M17.11    Surgical Assistant: Kaitlyn Medeiros     Preadmission Testing: Rosetta Peck MD     Pre-Op Clearance Requested: HISTORY AND PHYSICAL or MEDICAL CLEARANCE    DME:  NONE NEEDED    Rehab Services Ordered: HOME HEALTH or INTERNAL THERAPY     Disability Paperwork: NONE    On [facility] Calendar: YES    Notes: RT TKA. [employer].

## 2022-07-20 ENCOUNTER — TELEPHONE (OUTPATIENT)
Dept: ORTHOPEDICS CLINIC | Facility: CLINIC | Age: 72
End: 2022-07-20

## 2022-07-20 NOTE — TELEPHONE ENCOUNTER
Spoke with patient, who stated [facility] answered her original appt question. Pt did want to know when she can drive after surgery. Pt notified the average time is 4-6 weeks, and she will need to be off the narcotic pain medication and able to press on the gas/brake pedal when driving. Pt verbalized understanding. No further questions at this time.

## 2022-07-20 NOTE — TELEPHONE ENCOUNTER
LMOM asking patient to contact our office to schedule a follow up appointment to discuss left knee surgery. Ok to double book in a POST OP or PROCEDURE slot.

## 2022-07-20 NOTE — TELEPHONE ENCOUNTER
Patient requested to be scheduled in August.    Patient coming in to discuss surgery for a RIGHT knee. Future Appointments   Date Time Provider Alta Gill   8/31/2022  3:00 PM Aissatou Maria MD [facility]     Patient also had a question regarding the x-ray order that was entered yesterday. Please advise.

## 2022-07-21 NOTE — TELEPHONE ENCOUNTER
No prior auth or pre-cert required for 2 day stay IP CPT CODE 02968- medicare part B- for tracking purposes only

## 2022-07-27 ENCOUNTER — TELEPHONE (OUTPATIENT)
Dept: FAMILY MEDICINE CLINIC | Facility: CLINIC | Age: 72
End: 2022-07-27

## 2022-07-27 NOTE — TELEPHONE ENCOUNTER
Patient calling is scheduled for a pre op 9/15 does have order from surgeon office for labs patient wants to know if this will be done in the office or out patient lab

## 2022-07-27 NOTE — TELEPHONE ENCOUNTER
Patient is scheduled for right knee arthroplasty 10/4/2022 with Dr Fam Aponte    Pre -op is scheduled 9/15/2022    Pre op check list and orders placed in MA basket

## 2022-08-31 ENCOUNTER — OFFICE VISIT (OUTPATIENT)
Dept: ORTHOPEDICS CLINIC | Facility: CLINIC | Age: 72
End: 2022-08-31
Payer: MEDICARE

## 2022-08-31 ENCOUNTER — HOSPITAL ENCOUNTER (OUTPATIENT)
Dept: GENERAL RADIOLOGY | Age: 72
Discharge: HOME | End: 2022-08-31
Attending: ORTHOPAEDIC SURGERY
Payer: MEDICARE

## 2022-08-31 VITALS — OXYGEN SATURATION: 98 % | HEART RATE: 76 BPM | BODY MASS INDEX: 21.99 KG/M2 | WEIGHT: 118 LBS | HEIGHT: 61.5 IN

## 2022-08-31 DIAGNOSIS — M17.11 PRIMARY OSTEOARTHRITIS OF RIGHT KNEE: ICD-10-CM

## 2022-08-31 DIAGNOSIS — M17.11 PRIMARY OSTEOARTHRITIS OF RIGHT KNEE: Primary | ICD-10-CM

## 2022-08-31 PROCEDURE — 99213 OFFICE O/P EST LOW 20 MIN: CPT | Performed by: ORTHOPAEDIC SURGERY

## 2022-08-31 PROCEDURE — 77073 BONE LENGTH STUDIES: CPT | Performed by: ORTHOPAEDIC SURGERY

## 2022-08-31 RX ORDER — RIBOFLAVIN (VITAMIN B2) 100 MG
100 TABLET ORAL DAILY
COMMUNITY

## 2022-08-31 RX ORDER — ZINC SULFATE 50(220)MG
220 CAPSULE ORAL DAILY
COMMUNITY

## 2022-08-31 NOTE — PROGRESS NOTES
[facility] Progress Note    Subjective: Patient returns to clinic today for discussion of her right knee. She is present with a friend today who is a retired  from [employer]. She states that the knee remains significantly bothersome and she would like to proceed with knee replacement surgery. Pain is mostly about the medial aspect. She does not notice any alignment issues with the knee. Objective: Patient is awake alert no distress. No significant alignment coronal plane deformity issue noted. She demonstrates extension just short of full, flexion to around 120. Mild tenderness about the medial joint line. Imaging: Full-length lower extremity standing films personally viewed, independently interpreted and radiology report read. Demonstrates medial compartment osteoarthritis, bone-on-bone      Assessment/Plan: 26-year-old female with symptomatic radiographically moderate to severe right knee osteoarthritis. The patient has reasonably attempted nonsurgical treatments as mentioned above and remains limited in activities of daily living secondary to pain from knee arthritis. I discussed risks and benefits of surgery, with risks including but not limited to infection, blood clots, fracture, bleeding/need for blood transfusion, injury to blood vessels and nerves, loosening or failure of components, stiffness, continued pain, need for further intervention or revision surgery. Additionally I discussed expectations with regards to the postoperative recovery timeframe, the possibility of mechanical clicking with the knee, numbness on the lateral side of the knee/leg from sacrifice of the infrapatellar branch of the saphenous nerve, and potential for difficulty/pain with kneeling and performing deep flexion activities. The patient understands this discussion and elects to proceed with knee replacement surgery. Raheel Centeno was reviewed today.   Full-length lower extremity standing films were updated/obtained. She will need primary care evaluation/clearance within 1 month of surgery. All questions were answered.     Gideon Castañeda MD, [address] Orthopedic Surgery  Phone [phone]  Fax [phone]

## 2022-09-06 RX ORDER — SCOLOPAMINE TRANSDERMAL SYSTEM 1 MG/1
1 PATCH, EXTENDED RELEASE TRANSDERMAL ONCE
Status: CANCELLED | OUTPATIENT
Start: 2022-09-06 | End: 2022-09-06

## 2022-09-09 ENCOUNTER — TELEPHONE (OUTPATIENT)
Dept: FAMILY MEDICINE CLINIC | Facility: CLINIC | Age: 72
End: 2022-09-09

## 2022-09-09 NOTE — TELEPHONE ENCOUNTER
Preop orders received via fax from Jeovanny Arciniega for right total knee arthroplasty on 10/04/2022 placed in MA's basket with preop checklist form

## 2022-09-15 ENCOUNTER — TELEPHONE (OUTPATIENT)
Dept: FAMILY MEDICINE CLINIC | Facility: CLINIC | Age: 72
End: 2022-09-15

## 2022-09-15 ENCOUNTER — OFFICE VISIT (OUTPATIENT)
Dept: FAMILY MEDICINE CLINIC | Facility: CLINIC | Age: 72
End: 2022-09-15
Payer: MEDICARE

## 2022-09-15 VITALS
DIASTOLIC BLOOD PRESSURE: 70 MMHG | SYSTOLIC BLOOD PRESSURE: 126 MMHG | HEART RATE: 78 BPM | OXYGEN SATURATION: 99 % | RESPIRATION RATE: 20 BRPM | HEIGHT: 61 IN | TEMPERATURE: 98 F | BODY MASS INDEX: 22.28 KG/M2 | WEIGHT: 118 LBS

## 2022-09-15 DIAGNOSIS — I10 ESSENTIAL HYPERTENSION: ICD-10-CM

## 2022-09-15 DIAGNOSIS — Z01.818 PREOP EXAMINATION: Primary | ICD-10-CM

## 2022-09-15 DIAGNOSIS — M17.11 PRIMARY OSTEOARTHRITIS OF RIGHT KNEE: ICD-10-CM

## 2022-09-15 PROBLEM — M85.89 OSTEOPENIA OF MULTIPLE SITES: Status: RESOLVED | Noted: 2019-11-24 | Resolved: 2022-09-15

## 2022-09-15 PROBLEM — H02.9 LESION OF LEFT UPPER EYELID: Status: RESOLVED | Noted: 2020-11-18 | Resolved: 2022-09-15

## 2022-09-15 PROBLEM — M89.9 BONE DISORDER: Status: RESOLVED | Noted: 2020-02-13 | Resolved: 2022-09-15

## 2022-09-15 PROBLEM — L82.1 SEBORRHEIC KERATOSIS: Status: RESOLVED | Noted: 2019-11-24 | Resolved: 2022-09-15

## 2022-09-15 PROBLEM — L82.1 SEBORRHEIC KERATOSES: Status: RESOLVED | Noted: 2018-10-16 | Resolved: 2022-09-15

## 2022-09-15 PROBLEM — M81.0 POSTMENOPAUSAL OSTEOPOROSIS: Status: RESOLVED | Noted: 2022-03-06 | Resolved: 2022-09-15

## 2022-09-15 PROBLEM — D48.5 NEOPLASM OF UNCERTAIN BEHAVIOR OF SKIN: Status: RESOLVED | Noted: 2019-11-24 | Resolved: 2022-09-15

## 2022-09-15 PROBLEM — Z86.16 HISTORY OF 2019 NOVEL CORONAVIRUS DISEASE (COVID-19): Status: ACTIVE | Noted: 2022-09-15

## 2022-09-15 PROBLEM — D48.5 NEOPLASM OF UNCERTAIN BEHAVIOR OF SKIN OF FACE: Status: RESOLVED | Noted: 2018-10-16 | Resolved: 2022-09-15

## 2022-09-15 PROCEDURE — 1126F AMNT PAIN NOTED NONE PRSNT: CPT | Performed by: FAMILY MEDICINE

## 2022-09-15 PROCEDURE — 93000 ELECTROCARDIOGRAM COMPLETE: CPT | Performed by: FAMILY MEDICINE

## 2022-09-15 PROCEDURE — 99214 OFFICE O/P EST MOD 30 MIN: CPT | Performed by: FAMILY MEDICINE

## 2022-09-19 ENCOUNTER — HOSPITAL ENCOUNTER (OUTPATIENT)
Dept: PHYSICAL THERAPY | Facility: HOSPITAL | Age: 72
Discharge: HOME | End: 2022-09-19
Attending: ORTHOPAEDIC SURGERY

## 2022-09-19 ENCOUNTER — APPOINTMENT (OUTPATIENT)
Dept: LAB | Facility: HOSPITAL | Age: 72
End: 2022-09-19
Attending: ORTHOPAEDIC SURGERY

## 2022-09-19 ENCOUNTER — HOSPITAL ENCOUNTER (OUTPATIENT)
Dept: LAB | Facility: HOSPITAL | Age: 72
Discharge: HOME | End: 2022-09-19
Attending: ORTHOPAEDIC SURGERY

## 2022-09-19 DIAGNOSIS — Z01.818 PREOP EXAMINATION: ICD-10-CM

## 2022-09-19 DIAGNOSIS — I10 ESSENTIAL HYPERTENSION: ICD-10-CM

## 2022-09-19 DIAGNOSIS — M17.11 PRIMARY OSTEOARTHRITIS OF RIGHT KNEE: ICD-10-CM

## 2022-09-19 LAB
ALBUMIN SERPL-MCNC: 4 G/DL (ref 3.4–5)
ALBUMIN/GLOB SERPL: 1.2 {RATIO} (ref 1–2)
ALP LIVER SERPL-CCNC: 69 U/L
ALT SERPL-CCNC: 25 U/L
ANION GAP SERPL CALC-SCNC: 7 MMOL/L (ref 0–18)
ANTIBODY SCREEN: NEGATIVE
APTT PPP: 26.2 SECONDS (ref 23.3–35.6)
AST SERPL-CCNC: 17 U/L (ref 15–37)
BASOPHILS # BLD AUTO: 0.04 X10(3) UL (ref 0–0.2)
BASOPHILS NFR BLD AUTO: 0.9 %
BILIRUB SERPL-MCNC: 0.7 MG/DL (ref 0.1–2)
BUN BLD-MCNC: 10 MG/DL (ref 7–18)
CALCIUM BLD-MCNC: 9.3 MG/DL (ref 8.5–10.1)
CHLORIDE SERPL-SCNC: 96 MMOL/L (ref 98–112)
CO2 SERPL-SCNC: 28 MMOL/L (ref 21–32)
CREAT BLD-MCNC: 0.62 MG/DL
EOSINOPHIL # BLD AUTO: 0.06 X10(3) UL (ref 0–0.7)
EOSINOPHIL NFR BLD AUTO: 1.4 %
ERYTHROCYTE [DISTWIDTH] IN BLOOD BY AUTOMATED COUNT: 14 %
FASTING STATUS PATIENT QL REPORTED: NO
GFR SERPLBLD BASED ON 1.73 SQ M-ARVRAT: 94 ML/MIN/1.73M2 (ref 60–?)
GLOBULIN PLAS-MCNC: 3.4 G/DL (ref 2.8–4.4)
GLUCOSE BLD-MCNC: 98 MG/DL (ref 70–99)
HCT VFR BLD AUTO: 41.8 %
HGB BLD-MCNC: 14.2 G/DL
IMM GRANULOCYTES # BLD AUTO: 0.01 X10(3) UL (ref 0–1)
IMM GRANULOCYTES NFR BLD: 0.2 %
INR BLD: 1.01 (ref 0.85–1.16)
LYMPHOCYTES # BLD AUTO: 1.23 X10(3) UL (ref 1–4)
LYMPHOCYTES NFR BLD AUTO: 28.9 %
MCH RBC QN AUTO: 30.2 PG (ref 26–34)
MCHC RBC AUTO-ENTMCNC: 34 G/DL (ref 31–37)
MCV RBC AUTO: 88.9 FL
MONOCYTES # BLD AUTO: 0.39 X10(3) UL (ref 0.1–1)
MONOCYTES NFR BLD AUTO: 9.2 %
NEUTROPHILS # BLD AUTO: 2.52 X10 (3) UL (ref 1.5–7.7)
NEUTROPHILS # BLD AUTO: 2.52 X10(3) UL (ref 1.5–7.7)
NEUTROPHILS NFR BLD AUTO: 59.4 %
OSMOLALITY SERPL CALC.SUM OF ELEC: 271 MOSM/KG (ref 275–295)
PLATELET # BLD AUTO: 279 10(3)UL (ref 150–450)
POTASSIUM SERPL-SCNC: 3.5 MMOL/L (ref 3.5–5.1)
PROT SERPL-MCNC: 7.4 G/DL (ref 6.4–8.2)
PROTHROMBIN TIME: 13.3 SECONDS (ref 11.6–14.8)
RBC # BLD AUTO: 4.7 X10(6)UL
RH BLOOD TYPE: POSITIVE
SODIUM SERPL-SCNC: 131 MMOL/L (ref 136–145)
WBC # BLD AUTO: 4.3 X10(3) UL (ref 4–11)

## 2022-09-19 PROCEDURE — 85610 PROTHROMBIN TIME: CPT | Performed by: ORTHOPAEDIC SURGERY

## 2022-09-19 PROCEDURE — 85025 COMPLETE CBC W/AUTO DIFF WBC: CPT

## 2022-09-19 PROCEDURE — 86850 RBC ANTIBODY SCREEN: CPT | Performed by: ORTHOPAEDIC SURGERY

## 2022-09-19 PROCEDURE — 85730 THROMBOPLASTIN TIME PARTIAL: CPT | Performed by: ORTHOPAEDIC SURGERY

## 2022-09-19 PROCEDURE — 36415 COLL VENOUS BLD VENIPUNCTURE: CPT

## 2022-09-19 PROCEDURE — 87081 CULTURE SCREEN ONLY: CPT | Performed by: ORTHOPAEDIC SURGERY

## 2022-09-19 PROCEDURE — 86901 BLOOD TYPING SEROLOGIC RH(D): CPT | Performed by: ORTHOPAEDIC SURGERY

## 2022-09-19 PROCEDURE — 86900 BLOOD TYPING SEROLOGIC ABO: CPT | Performed by: ORTHOPAEDIC SURGERY

## 2022-09-19 PROCEDURE — 80053 COMPREHEN METABOLIC PANEL: CPT

## 2022-09-22 ENCOUNTER — ANESTHESIA EVENT (OUTPATIENT)
Dept: SURGERY | Facility: HOSPITAL | Age: 72
End: 2022-09-22
Payer: MEDICARE

## 2022-09-22 ENCOUNTER — LAB ENCOUNTER (OUTPATIENT)
Dept: LAB | Age: 72
End: 2022-09-22
Attending: FAMILY MEDICINE

## 2022-09-27 ENCOUNTER — ORDER TRANSCRIPTION (OUTPATIENT)
Dept: PHYSICAL THERAPY | Facility: HOSPITAL | Age: 72
End: 2022-09-27

## 2022-09-27 ENCOUNTER — TELEPHONE (OUTPATIENT)
Dept: PHYSICAL THERAPY | Facility: HOSPITAL | Age: 72
End: 2022-09-27

## 2022-09-27 DIAGNOSIS — M17.11 ARTHRITIS OF RIGHT KNEE: Primary | ICD-10-CM

## 2022-10-01 ENCOUNTER — LAB ENCOUNTER (OUTPATIENT)
Dept: LAB | Age: 72
End: 2022-10-01
Attending: ORTHOPAEDIC SURGERY
Payer: MEDICARE

## 2022-10-01 DIAGNOSIS — M17.11 PRIMARY OSTEOARTHRITIS OF RIGHT KNEE: ICD-10-CM

## 2022-10-02 LAB — SARS-COV-2 RNA RESP QL NAA+PROBE: NOT DETECTED

## 2022-10-04 ENCOUNTER — ANESTHESIA (OUTPATIENT)
Dept: SURGERY | Facility: HOSPITAL | Age: 72
End: 2022-10-04
Payer: MEDICARE

## 2022-10-04 ENCOUNTER — APPOINTMENT (OUTPATIENT)
Dept: GENERAL RADIOLOGY | Facility: HOSPITAL | Age: 72
End: 2022-10-04
Attending: ORTHOPAEDIC SURGERY
Payer: MEDICARE

## 2022-10-04 ENCOUNTER — HOSPITAL ENCOUNTER (INPATIENT)
Facility: HOSPITAL | Age: 72
LOS: 1 days | Discharge: HOME | End: 2022-10-05
Attending: ORTHOPAEDIC SURGERY | Admitting: ORTHOPAEDIC SURGERY
Payer: MEDICARE

## 2022-10-04 DIAGNOSIS — M17.11 ARTHRITIS OF RIGHT KNEE: ICD-10-CM

## 2022-10-04 DIAGNOSIS — M17.11 PRIMARY OSTEOARTHRITIS OF RIGHT KNEE: Primary | ICD-10-CM

## 2022-10-04 LAB
CHLORIDE SERPL-SCNC: 100 MMOL/L (ref 98–112)
CO2 SERPL-SCNC: 24 MMOL/L (ref 21–32)
POTASSIUM SERPL-SCNC: 3.5 MMOL/L (ref 3.5–5.1)
SODIUM SERPL-SCNC: 132 MMOL/L (ref 136–145)

## 2022-10-04 PROCEDURE — 73560 X-RAY EXAM OF KNEE 1 OR 2: CPT | Performed by: ORTHOPAEDIC SURGERY

## 2022-10-04 PROCEDURE — 27447 TOTAL KNEE ARTHROPLASTY: CPT | Performed by: ORTHOPAEDIC SURGERY

## 2022-10-04 PROCEDURE — 0SRC0J9 REPLACEMENT OF RIGHT KNEE JOINT WITH SYNTHETIC SUBSTITUTE, CEMENTED, OPEN APPROACH: ICD-10-PCS | Performed by: ORTHOPAEDIC SURGERY

## 2022-10-04 PROCEDURE — 3E0T3BZ INTRODUCTION OF ANESTHETIC AGENT INTO PERIPHERAL NERVES AND PLEXI, PERCUTANEOUS APPROACH: ICD-10-PCS | Performed by: STUDENT IN AN ORGANIZED HEALTH CARE EDUCATION/TRAINING PROGRAM

## 2022-10-04 PROCEDURE — 76942 ECHO GUIDE FOR BIOPSY: CPT | Performed by: STUDENT IN AN ORGANIZED HEALTH CARE EDUCATION/TRAINING PROGRAM

## 2022-10-04 PROCEDURE — 3E0T33Z INTRODUCTION OF ANTI-INFLAMMATORY INTO PERIPHERAL NERVES AND PLEXI, PERCUTANEOUS APPROACH: ICD-10-PCS | Performed by: STUDENT IN AN ORGANIZED HEALTH CARE EDUCATION/TRAINING PROGRAM

## 2022-10-04 DEVICE — TIBIAL INSERT FIXED SPHERE FLEX  #2/11 MM R
Type: IMPLANTABLE DEVICE | Site: KNEE | Status: FUNCTIONAL
Brand: GMK SPHERE TOTAL KNEE SYSTEM

## 2022-10-04 DEVICE — FIXED TIBIAL TRAY CEMENTED RIGHT, SIZE 2
Type: IMPLANTABLE DEVICE | Site: KNEE | Status: FUNCTIONAL
Brand: GMK PRIMARY TOTAL KNEE SYSTEM

## 2022-10-04 DEVICE — FEMUR SPHERE CEMENTED RIGHT, SIZE 2
Type: IMPLANTABLE DEVICE | Site: KNEE | Status: FUNCTIONAL
Brand: GMK SPHERE TOTAL KNEE SYSTEM

## 2022-10-04 DEVICE — IMPLANTABLE DEVICE
Type: IMPLANTABLE DEVICE | Site: KNEE | Status: FUNCTIONAL
Brand: BIOMET® BONE CEMENT R

## 2022-10-04 RX ORDER — SODIUM PHOSPHATE, DIBASIC AND SODIUM PHOSPHATE, MONOBASIC 7; 19 G/133ML; G/133ML
1 ENEMA RECTAL ONCE AS NEEDED
Status: DISCONTINUED | OUTPATIENT
Start: 2022-10-04 | End: 2022-10-05

## 2022-10-04 RX ORDER — ACETAMINOPHEN 325 MG/1
TABLET ORAL
Status: COMPLETED
Start: 2022-10-04 | End: 2022-10-04

## 2022-10-04 RX ORDER — MELATONIN
325
Status: DISCONTINUED | OUTPATIENT
Start: 2022-10-05 | End: 2022-10-05

## 2022-10-04 RX ORDER — DIPHENHYDRAMINE HYDROCHLORIDE 50 MG/ML
12.5 INJECTION INTRAMUSCULAR; INTRAVENOUS EVERY 4 HOURS PRN
Status: DISCONTINUED | OUTPATIENT
Start: 2022-10-04 | End: 2022-10-05

## 2022-10-04 RX ORDER — METOCLOPRAMIDE HYDROCHLORIDE 5 MG/ML
10 INJECTION INTRAMUSCULAR; INTRAVENOUS EVERY 8 HOURS PRN
Status: DISCONTINUED | OUTPATIENT
Start: 2022-10-04 | End: 2022-10-05

## 2022-10-04 RX ORDER — MEPERIDINE HYDROCHLORIDE 25 MG/ML
12.5 INJECTION INTRAMUSCULAR; INTRAVENOUS; SUBCUTANEOUS AS NEEDED
Status: DISCONTINUED | OUTPATIENT
Start: 2022-10-04 | End: 2022-10-04 | Stop reason: HOSPADM

## 2022-10-04 RX ORDER — HYDROMORPHONE HYDROCHLORIDE 1 MG/ML
0.4 INJECTION, SOLUTION INTRAMUSCULAR; INTRAVENOUS; SUBCUTANEOUS EVERY 2 HOUR PRN
Status: DISCONTINUED | OUTPATIENT
Start: 2022-10-04 | End: 2022-10-05

## 2022-10-04 RX ORDER — TRANEXAMIC ACID 10 MG/ML
1000 INJECTION, SOLUTION INTRAVENOUS ONCE
Status: DISCONTINUED | OUTPATIENT
Start: 2022-10-04 | End: 2022-10-04 | Stop reason: HOSPADM

## 2022-10-04 RX ORDER — ONDANSETRON 2 MG/ML
INJECTION INTRAMUSCULAR; INTRAVENOUS AS NEEDED
Status: DISCONTINUED | OUTPATIENT
Start: 2022-10-04 | End: 2022-10-04 | Stop reason: SURG

## 2022-10-04 RX ORDER — LIDOCAINE HYDROCHLORIDE 10 MG/ML
INJECTION, SOLUTION EPIDURAL; INFILTRATION; INTRACAUDAL; PERINEURAL AS NEEDED
Status: DISCONTINUED | OUTPATIENT
Start: 2022-10-04 | End: 2022-10-04 | Stop reason: SURG

## 2022-10-04 RX ORDER — CEFAZOLIN SODIUM 1 G/3ML
INJECTION, POWDER, FOR SOLUTION INTRAMUSCULAR; INTRAVENOUS AS NEEDED
Status: DISCONTINUED | OUTPATIENT
Start: 2022-10-04 | End: 2022-10-04 | Stop reason: SURG

## 2022-10-04 RX ORDER — DOCUSATE SODIUM 100 MG/1
100 CAPSULE, LIQUID FILLED ORAL 2 TIMES DAILY
Status: DISCONTINUED | OUTPATIENT
Start: 2022-10-04 | End: 2022-10-05

## 2022-10-04 RX ORDER — OXYCODONE HYDROCHLORIDE 5 MG/1
2.5 TABLET ORAL EVERY 4 HOURS PRN
Status: DISCONTINUED | OUTPATIENT
Start: 2022-10-04 | End: 2022-10-05

## 2022-10-04 RX ORDER — HYDROMORPHONE HYDROCHLORIDE 1 MG/ML
0.2 INJECTION, SOLUTION INTRAMUSCULAR; INTRAVENOUS; SUBCUTANEOUS EVERY 2 HOUR PRN
Status: DISCONTINUED | OUTPATIENT
Start: 2022-10-04 | End: 2022-10-05

## 2022-10-04 RX ORDER — ONDANSETRON 2 MG/ML
4 INJECTION INTRAMUSCULAR; INTRAVENOUS EVERY 6 HOURS PRN
Status: DISCONTINUED | OUTPATIENT
Start: 2022-10-04 | End: 2022-10-04 | Stop reason: HOSPADM

## 2022-10-04 RX ORDER — HYDROMORPHONE HYDROCHLORIDE 1 MG/ML
0.6 INJECTION, SOLUTION INTRAMUSCULAR; INTRAVENOUS; SUBCUTANEOUS EVERY 5 MIN PRN
Status: DISCONTINUED | OUTPATIENT
Start: 2022-10-04 | End: 2022-10-04 | Stop reason: HOSPADM

## 2022-10-04 RX ORDER — NALOXONE HYDROCHLORIDE 0.4 MG/ML
80 INJECTION, SOLUTION INTRAMUSCULAR; INTRAVENOUS; SUBCUTANEOUS AS NEEDED
Status: DISCONTINUED | OUTPATIENT
Start: 2022-10-04 | End: 2022-10-04 | Stop reason: HOSPADM

## 2022-10-04 RX ORDER — POLYETHYLENE GLYCOL 3350 17 G/17G
17 POWDER, FOR SOLUTION ORAL DAILY PRN
Status: DISCONTINUED | OUTPATIENT
Start: 2022-10-04 | End: 2022-10-05

## 2022-10-04 RX ORDER — CEFAZOLIN SODIUM/WATER 2 G/20 ML
2 SYRINGE (ML) INTRAVENOUS EVERY 8 HOURS
Status: DISCONTINUED | OUTPATIENT
Start: 2022-10-04 | End: 2022-10-04

## 2022-10-04 RX ORDER — TRANEXAMIC ACID 10 MG/ML
INJECTION, SOLUTION INTRAVENOUS AS NEEDED
Status: DISCONTINUED | OUTPATIENT
Start: 2022-10-04 | End: 2022-10-04 | Stop reason: SURG

## 2022-10-04 RX ORDER — KETOROLAC TROMETHAMINE 15 MG/ML
15 INJECTION, SOLUTION INTRAMUSCULAR; INTRAVENOUS EVERY 6 HOURS
Status: COMPLETED | OUTPATIENT
Start: 2022-10-04 | End: 2022-10-05

## 2022-10-04 RX ORDER — OXYCODONE HYDROCHLORIDE 10 MG/1
10 TABLET ORAL EVERY 4 HOURS PRN
Status: DISCONTINUED | OUTPATIENT
Start: 2022-10-04 | End: 2022-10-05

## 2022-10-04 RX ORDER — ONDANSETRON 2 MG/ML
4 INJECTION INTRAMUSCULAR; INTRAVENOUS EVERY 6 HOURS PRN
Status: DISCONTINUED | OUTPATIENT
Start: 2022-10-04 | End: 2022-10-05

## 2022-10-04 RX ORDER — HYDROMORPHONE HYDROCHLORIDE 1 MG/ML
0.2 INJECTION, SOLUTION INTRAMUSCULAR; INTRAVENOUS; SUBCUTANEOUS EVERY 5 MIN PRN
Status: DISCONTINUED | OUTPATIENT
Start: 2022-10-04 | End: 2022-10-04 | Stop reason: HOSPADM

## 2022-10-04 RX ORDER — DEXAMETHASONE SODIUM PHOSPHATE 4 MG/ML
VIAL (ML) INJECTION AS NEEDED
Status: DISCONTINUED | OUTPATIENT
Start: 2022-10-04 | End: 2022-10-04 | Stop reason: SURG

## 2022-10-04 RX ORDER — BISACODYL 10 MG
10 SUPPOSITORY, RECTAL RECTAL
Status: DISCONTINUED | OUTPATIENT
Start: 2022-10-04 | End: 2022-10-05

## 2022-10-04 RX ORDER — DIPHENHYDRAMINE HYDROCHLORIDE 50 MG/ML
12.5 INJECTION INTRAMUSCULAR; INTRAVENOUS AS NEEDED
Status: DISCONTINUED | OUTPATIENT
Start: 2022-10-04 | End: 2022-10-04 | Stop reason: HOSPADM

## 2022-10-04 RX ORDER — ASPIRIN 325 MG
325 TABLET, DELAYED RELEASE (ENTERIC COATED) ORAL 2 TIMES DAILY
Status: DISCONTINUED | OUTPATIENT
Start: 2022-10-04 | End: 2022-10-05

## 2022-10-04 RX ORDER — DIPHENHYDRAMINE HCL 25 MG
25 CAPSULE ORAL EVERY 4 HOURS PRN
Status: DISCONTINUED | OUTPATIENT
Start: 2022-10-04 | End: 2022-10-05

## 2022-10-04 RX ORDER — TRAMADOL HYDROCHLORIDE 50 MG/1
50 TABLET ORAL EVERY 12 HOURS SCHEDULED
Status: DISCONTINUED | OUTPATIENT
Start: 2022-10-04 | End: 2022-10-05

## 2022-10-04 RX ORDER — MIDAZOLAM HYDROCHLORIDE 1 MG/ML
1 INJECTION INTRAMUSCULAR; INTRAVENOUS EVERY 5 MIN PRN
Status: DISCONTINUED | OUTPATIENT
Start: 2022-10-04 | End: 2022-10-04 | Stop reason: HOSPADM

## 2022-10-04 RX ORDER — OXYCODONE HYDROCHLORIDE 5 MG/1
5 TABLET ORAL EVERY 4 HOURS PRN
Status: DISCONTINUED | OUTPATIENT
Start: 2022-10-04 | End: 2022-10-05

## 2022-10-04 RX ORDER — BUPRENORPHINE HYDROCHLORIDE 0.32 MG/ML
INJECTION INTRAMUSCULAR; INTRAVENOUS AS NEEDED
Status: DISCONTINUED | OUTPATIENT
Start: 2022-10-04 | End: 2022-10-04 | Stop reason: SURG

## 2022-10-04 RX ORDER — METOCLOPRAMIDE HYDROCHLORIDE 5 MG/ML
10 INJECTION INTRAMUSCULAR; INTRAVENOUS EVERY 8 HOURS PRN
Status: DISCONTINUED | OUTPATIENT
Start: 2022-10-04 | End: 2022-10-04 | Stop reason: HOSPADM

## 2022-10-04 RX ORDER — HYDROMORPHONE HYDROCHLORIDE 1 MG/ML
0.8 INJECTION, SOLUTION INTRAMUSCULAR; INTRAVENOUS; SUBCUTANEOUS EVERY 2 HOUR PRN
Status: DISCONTINUED | OUTPATIENT
Start: 2022-10-04 | End: 2022-10-05

## 2022-10-04 RX ORDER — DIPHENHYDRAMINE HYDROCHLORIDE 50 MG/ML
25 INJECTION INTRAMUSCULAR; INTRAVENOUS ONCE AS NEEDED
Status: ACTIVE | OUTPATIENT
Start: 2022-10-04 | End: 2022-10-04

## 2022-10-04 RX ORDER — DEXAMETHASONE SODIUM PHOSPHATE 10 MG/ML
8 INJECTION, SOLUTION INTRAMUSCULAR; INTRAVENOUS ONCE
Status: COMPLETED | OUTPATIENT
Start: 2022-10-05 | End: 2022-10-05

## 2022-10-04 RX ORDER — ACETAMINOPHEN 325 MG/1
650 TABLET ORAL ONCE
Status: COMPLETED | OUTPATIENT
Start: 2022-10-04 | End: 2022-10-04

## 2022-10-04 RX ORDER — DEXAMETHASONE SODIUM PHOSPHATE 10 MG/ML
INJECTION, SOLUTION INTRAMUSCULAR; INTRAVENOUS AS NEEDED
Status: DISCONTINUED | OUTPATIENT
Start: 2022-10-04 | End: 2022-10-04 | Stop reason: SURG

## 2022-10-04 RX ORDER — ACETAMINOPHEN 500 MG
1000 TABLET ORAL ONCE
Status: DISCONTINUED | OUTPATIENT
Start: 2022-10-04 | End: 2022-10-04 | Stop reason: HOSPADM

## 2022-10-04 RX ORDER — HYDROMORPHONE HYDROCHLORIDE 1 MG/ML
0.4 INJECTION, SOLUTION INTRAMUSCULAR; INTRAVENOUS; SUBCUTANEOUS EVERY 5 MIN PRN
Status: DISCONTINUED | OUTPATIENT
Start: 2022-10-04 | End: 2022-10-04 | Stop reason: HOSPADM

## 2022-10-04 RX ORDER — CEFAZOLIN SODIUM/WATER 2 G/20 ML
2 SYRINGE (ML) INTRAVENOUS EVERY 8 HOURS
Status: DISCONTINUED | OUTPATIENT
Start: 2022-10-04 | End: 2022-10-05

## 2022-10-04 RX ORDER — SODIUM CHLORIDE, SODIUM LACTATE, POTASSIUM CHLORIDE, CALCIUM CHLORIDE 600; 310; 30; 20 MG/100ML; MG/100ML; MG/100ML; MG/100ML
INJECTION, SOLUTION INTRAVENOUS CONTINUOUS
Status: DISCONTINUED | OUTPATIENT
Start: 2022-10-04 | End: 2022-10-05

## 2022-10-04 RX ORDER — ACETAMINOPHEN 325 MG/1
650 TABLET ORAL 4 TIMES DAILY
Status: DISCONTINUED | OUTPATIENT
Start: 2022-10-04 | End: 2022-10-05

## 2022-10-04 RX ORDER — SODIUM CHLORIDE, SODIUM LACTATE, POTASSIUM CHLORIDE, CALCIUM CHLORIDE 600; 310; 30; 20 MG/100ML; MG/100ML; MG/100ML; MG/100ML
INJECTION, SOLUTION INTRAVENOUS CONTINUOUS
Status: DISCONTINUED | OUTPATIENT
Start: 2022-10-04 | End: 2022-10-04 | Stop reason: HOSPADM

## 2022-10-04 RX ORDER — MIDAZOLAM HYDROCHLORIDE 1 MG/ML
INJECTION INTRAMUSCULAR; INTRAVENOUS AS NEEDED
Status: DISCONTINUED | OUTPATIENT
Start: 2022-10-04 | End: 2022-10-04 | Stop reason: SURG

## 2022-10-04 RX ORDER — BUPIVACAINE HYDROCHLORIDE 7.5 MG/ML
INJECTION, SOLUTION INTRASPINAL AS NEEDED
Status: DISCONTINUED | OUTPATIENT
Start: 2022-10-04 | End: 2022-10-04 | Stop reason: SURG

## 2022-10-04 RX ORDER — SENNOSIDES 8.6 MG
17.2 TABLET ORAL NIGHTLY
Status: DISCONTINUED | OUTPATIENT
Start: 2022-10-04 | End: 2022-10-05

## 2022-10-04 RX ADMIN — DEXAMETHASONE SODIUM PHOSPHATE 4 MG: 4 MG/ML VIAL (ML) INJECTION at 12:30:00

## 2022-10-04 RX ADMIN — SODIUM CHLORIDE, SODIUM LACTATE, POTASSIUM CHLORIDE, CALCIUM CHLORIDE: 600; 310; 30; 20 INJECTION, SOLUTION INTRAVENOUS at 12:29:00

## 2022-10-04 RX ADMIN — TRANEXAMIC ACID 1000 MG: 10 INJECTION, SOLUTION INTRAVENOUS at 11:01:00

## 2022-10-04 RX ADMIN — DEXAMETHASONE SODIUM PHOSPHATE 2 MG: 10 INJECTION, SOLUTION INTRAMUSCULAR; INTRAVENOUS at 10:56:00

## 2022-10-04 RX ADMIN — BUPIVACAINE HYDROCHLORIDE 2 ML: 7.5 INJECTION, SOLUTION INTRASPINAL at 10:51:00

## 2022-10-04 RX ADMIN — MIDAZOLAM HYDROCHLORIDE 2 MG: 1 INJECTION INTRAMUSCULAR; INTRAVENOUS at 10:46:00

## 2022-10-04 RX ADMIN — ONDANSETRON 4 MG: 2 INJECTION INTRAMUSCULAR; INTRAVENOUS at 12:30:00

## 2022-10-04 RX ADMIN — BUPRENORPHINE HYDROCHLORIDE 150 MCG: 0.32 INJECTION INTRAMUSCULAR; INTRAVENOUS at 10:56:00

## 2022-10-04 RX ADMIN — SODIUM CHLORIDE, SODIUM LACTATE, POTASSIUM CHLORIDE, CALCIUM CHLORIDE: 600; 310; 30; 20 INJECTION, SOLUTION INTRAVENOUS at 11:22:00

## 2022-10-04 RX ADMIN — LIDOCAINE HYDROCHLORIDE 50 MG: 10 INJECTION, SOLUTION EPIDURAL; INFILTRATION; INTRACAUDAL; PERINEURAL at 10:58:00

## 2022-10-04 RX ADMIN — CEFAZOLIN SODIUM 2 G: 1 INJECTION, POWDER, FOR SOLUTION INTRAMUSCULAR; INTRAVENOUS at 11:03:00

## 2022-10-04 NOTE — ANESTHESIA PROCEDURE NOTES
Regional Block  Performed by: Lena Armas MD  Authorized by: Lena Armas MD       General Information and Staff    Start Time:  10/4/2022 10:51 AM  End Time:  10/4/2022 10:56 AM  Anesthesiologist:  Lena Armas MD  Performed by: Anesthesiologist  Patient Location:  OR    Block Placement: Post Induction  Site Identification: real time ultrasound guided and image stored and retrievable    Block site/laterality marked before start: site marked  Reason for Block: at surgeon's request and post-op pain management    Preanesthetic Checklist: 2 patient identifers, IV checked, risks and benefits discussed, monitors and equipment checked, pre-op evaluation, timeout performed, anesthesia consent, sterile technique used, no prohibitive neurological deficits and no local skin infection at insertion site      Procedure Details    Patient Position:  Supine  Prep: ChloraPrep    Monitoring:  Cardiac monitor, continuous pulse ox and blood pressure cuff  Block Type: Adductor canal  Laterality:  Right  Injection Technique:  Single-shot    Needle    Needle Type:  Short-bevel and echogenic  Needle Gauge:  22 G  Needle Length:  100 mm  Needle Localization:  Ultrasound guidance  Reason for Ultrasound Use: appropriate spread of the medication was noted in real time and no ultrasound evidence of intravascular and/or intraneural injection            Assessment    Injection Assessment:  Good spread noted, negative resistance, negative aspiration for heme, incremental injection and low pressure  Heart Rate Change: No    - Patient tolerated block procedure well without evidence of immediate block related complications.      Medications      Additional Comments    Medication:  Ropivacaine 0.375% 20mL with 2mg PF decadron and 150 mcg buprenorphine

## 2022-10-04 NOTE — ANESTHESIA PROCEDURE NOTES
Spinal Block  Performed by: Alejandra Lopez MD  Authorized by: Alejandra Lopez MD       General Information and Staff    Start Time:  10/4/2022 10:46 AM  End Time:  10/4/2022 10:51 AM  Anesthesiologist:  Alejandra Lopez MD  Performed by:   Anesthesiologist  Patient Location:  OR  Site identification: surface landmarks    Preanesthetic Checklist: patient identified, IV checked, risks and benefits discussed, monitors and equipment checked, pre-op evaluation, timeout performed, anesthesia consent and sterile technique used      Procedure Details    Patient Position:  Sitting  Prep: ChloraPrep    Monitoring:  Cardiac monitor, heart rate and continuous pulse ox  Approach:  Midline  Location:  L3-4  Injection Technique:  Single-shot    Needle    Needle Type:  Sprotte  Needle Gauge:  24 G  Needle Length:  3.5 in    Assessment    Sensory Level:   Events: clear CSF, CSF aspirated, well tolerated and blood negative     Additional Comments

## 2022-10-04 NOTE — PLAN OF CARE
Pt a/ox4. Denies pain. Scheduled pain meds given as ordered. Up with minimal assist and walker. 2 incontinent voids since arrival on unit, purewick in place.    PT/OT to see again in am, plan to home with home health when cleared by MD.

## 2022-10-04 NOTE — OPERATIVE REPORT
Operative Note    Patient Name/: Kristyn Viveros 1950  Date: 10/4/2022  Location: [facility]  Preoperative Diagnosis: Right knee osteoarthritis  Postoperative Diagnosis: Same  Operation: Right total knee arthroplasty mechanical alignment  Primary Surgeon: Alex Maurer  Assistant: Jorge Sanchez surgical assistant first assist.  Sheron Rubio also assisted in this case. Please note a skilled surgical assistant was necessary for this case in order to assist with patient positioning, prepping, draping, incision, exposure, implant placement, wound closure. Indications: 59-year-old female with end-stage right knee osteoarthritis  Surgical Findings: End-stage varus OA  Operative Report: After informed consent, the right lower extremity was marked. Patient was brought to the operating room where spinal anesthesia was induced. Preoperative exam demonstrated range of motion from a few degrees/5 degrees short of full extension, gravity flexion to around 110. The right lower extremity was prepped and draped in sterile fashion. Timeout was performed to verify correct surgical site and procedure. 2 g of Ancef was given within 1 hour of incision. Additionally, 1 g tranexamic acid was given intravenously. Next the limb was gravity exsanguinated and tourniquet inflated. Incision was made anteriorly from 2 fingerbreadths proximal to the patella down along the medial aspect of the tibial tubercle. This was carried down to the extensor mechanism. Medial and lateral flaps were developed. The VMO muscle belly was identified. Full-thickness medial parapatellar arthrotomy was made from 2 fingerbreadths proximal to the patella along the VMO muscle belly, around the medial patella and patellar tendon. The fat pad was dissected free from the patellar tendon and reflected medially. Subperiosteal dissection was carried out posteriorly about the proximal medial aspect of the tibia.   Osteophytes were removed from the proximal medial tibia. The lateral patellofemoral plica was incised, and the patella was everted and denervated. The synovium over the anterior distal femur was teed open. Next the knee was flexed up, and remnant of the ACL and lateral meniscus were excised. Whitesides line was marked on the trochlear groove. I used an intramedullary reamer to open up the femur for our intramedullary alignment guide. The distal femoral cutting block was placed on the distal femur to take off 10 mm of bone. It sat down on the distal medial femur, measuring to take off more medial bone than lateral.  This was pinned in place. A maría elena's wing was used to check the cut, and the cut was performed. The distal medial femur fragment measured 9 mm. Next the knee was flexed and the tibial crest was marked. An extra medullary tibial cutting guide was placed above the ankle, measuring to take off a few millimeters off the medial side, pinned in line with the tibial crest in the coronal plane and with a few degrees of posterior slope in the sagittal plane. The proximal tibia was cut, using a Z retractor to protect MCL, patellar tendon and lateral soft tissues. After this portion of bone was removed, the knee was brought into extension and a lamina  was used to open up the extension space. What remained of the medial and lateral menisci were removed. Spacer block was placed with a T-handle drop-down mike to verify appropriate cut angle. The knee was a touch tight on the medial side. Next the knee was flexed up, and the femoral sizing block with stylus was used. This sized for a size 2 femur. Pins were placed, and the sizing block removed. Pins were noted to be parallel to the epicondylar axis and perpendicular to [employer]. The 4-in-1 cutting block was placed and screwed down. Anterior distal femur was cut, demonstrating a positive grand piano sign. Posterior condyles were cut, measuring 8 on the posterior medial cut. Chamfers were then cut. The cutting block was removed, and the knee was flexed with a lamina  to open up the posterior knee joint. Notch contents and PCL were removed. Posterior condylar recesses were opened, and posterior osteophytes chiseled out. The knee was noted to be a touch tight on the medial side in flexion as well. Medial release was performed, semimembranosus insertion at the proximal medial and posterior tibia as well as posterior medial and posterior osteophytes. It was still a touch tight, I did downsize the tibia 1 to 2 mm on the medial and posterior side with the size 2 baseplate lateralized. This gave adequate balance with the sizer block. The knee was brought into extension, and sized for a tibial baseplate. Trials were then placed. With the 2 femur, the 2 tibia, and a 11 polyethylene liner, the knee came to full extension with excellent valgus stability, 1 to 2 mm varus laxity. In mid flexion, there was 3 to 4 mm varus laxity, no valgus laxity, Lachman's rotating around the medial femoral condyle. In 90 degrees anterior drawer was less than 5 mm rotating around the medial femoral condyle. Gravity flexion was to 120-125 with the patella tracking centrally. .  The tibial component rotation was marked, and the tibial trial removed. The femoral lugs were drilled, and the anterior chamfer finishing guide was placed to cut the finishing anterior chamfer cut. The knee was then flexed, and the tibia brought forward to place the tibial baseplate trial.  This was pinned in place, and the drill and keel punch were used to prepare the proximal tibia. The knee was then brought into extension, the patella everted, and it was measured to be only 17 mm therefore circumferential denervation and osteophyte removal with lateral facetectomy was performed.   Next final components were opened, bony surfaces were washed and dried, and periarticular pain cocktail was injected with 30 mL in the posterior capsule, 10 mL into lateral femoral and tibial periosteum, 10 mL into medial femoral and tibial periosteum, and 10 mL into extensor mechanism. Cement was then mixed. The tibial component was cemented along with the proximal tibial bone surface, the component was impacted into place followed by removal of excess cement. The femur was reduced onto the tibia, and cement was placed on the cut surface of the femur. The cemented femoral component was then impacted down, with removal of excess cement. The distal femoral component was left up 2 mm on the lateral side. The knee was brought into full extension and an axial load was held across the joint. The patella was cemented along with the button, and this was compressed and held in place. Once cement had hardened, the knee was trialed again. Optimal stability was achieved with a 11 mm polyethylene liner, with exam demonstrating same as with trials, except now excellent varus valgus stability in full extension and 2 to 3 mm varus laxity in mid flexion. The trial poly was removed, the knee was washed out, any loose bodies removed, and final poly was placed. Betadine lavage was performed. Closure was performed with 5 Ethibond for the arthrotomy, 2-0 Vicryl for skin, followed by staples. A sterile dressing was applied, and the patient was brought to PACU in good condition.   Anesthesia: Spinal plus abductor canal block  Complications: None  Specimens: None  Blood loss: 25 mL  Fluids: See anesthesia report  Implants: Medacta GM K sphere 2 femur, 2 tibia, 11 poly-  Drains: None  Condition: Good  Disposition: Floor    -Weightbearing as tolerated, PT OT  -DVT prophylaxis mechanical plus aspirin twice daily  -Pain control with oral meds  -Perioperative Vince Ramirez MD, [address] Orthopedic Surgery  Phone [phone]  Fax [phone]

## 2022-10-04 NOTE — INTERVAL H&P NOTE
Pre-op Diagnosis: Arthritis of right knee [M17.11]    The above referenced H&P was reviewed by Frida Caldera MD on 10/4/2022, the patient was examined and no significant changes have occurred in the patient's condition since the H&P was performed. I discussed with the patient and/or legal representative the potential benefits, risks and side effects of this procedure; the likelihood of the patient achieving goals; and potential problems that might occur during recuperation. I discussed reasonable alternatives to the procedure, including risks, benefits and side effects related to the alternatives and risks related to not receiving this procedure. We will proceed with procedure as planned.

## 2022-10-04 NOTE — PROGRESS NOTES
Patient arrived on unit from PACU s/p RTKA. A/ox4. Oriented to room and phone. Call light within reach.

## 2022-10-05 VITALS
DIASTOLIC BLOOD PRESSURE: 53 MMHG | BODY MASS INDEX: 22.37 KG/M2 | WEIGHT: 120 LBS | OXYGEN SATURATION: 97 % | HEIGHT: 61.5 IN | HEART RATE: 83 BPM | SYSTOLIC BLOOD PRESSURE: 103 MMHG | RESPIRATION RATE: 17 BRPM | TEMPERATURE: 98 F

## 2022-10-05 PROCEDURE — 99024 POSTOP FOLLOW-UP VISIT: CPT | Performed by: ORTHOPAEDIC SURGERY

## 2022-10-05 RX ORDER — CELECOXIB 200 MG/1
200 CAPSULE ORAL DAILY
Qty: 30 CAPSULE | Refills: 0 | Status: SHIPPED | OUTPATIENT
Start: 2022-10-05 | End: 2022-11-04

## 2022-10-05 RX ORDER — TRAMADOL HYDROCHLORIDE 50 MG/1
50 TABLET ORAL EVERY 8 HOURS
Qty: 45 TABLET | Refills: 0 | Status: SHIPPED | OUTPATIENT
Start: 2022-10-05 | End: 2022-10-20

## 2022-10-05 RX ORDER — PSEUDOEPHEDRINE HCL 30 MG
100 TABLET ORAL 2 TIMES DAILY PRN
Qty: 20 CAPSULE | Refills: 0 | Status: SHIPPED | OUTPATIENT
Start: 2022-10-05

## 2022-10-05 RX ORDER — OXYCODONE HYDROCHLORIDE 5 MG/1
TABLET ORAL EVERY 4 HOURS PRN
Qty: 30 TABLET | Refills: 0 | Status: SHIPPED | OUTPATIENT
Start: 2022-10-05

## 2022-10-05 RX ORDER — ACETAMINOPHEN 500 MG
1000 TABLET ORAL EVERY 8 HOURS
Qty: 90 TABLET | Refills: 0 | Status: SHIPPED | OUTPATIENT
Start: 2022-10-05 | End: 2022-10-20

## 2022-10-05 NOTE — PLAN OF CARE
Patient is alert and oriented x4, denies any pain at this time, giving scheduled pain meds as ordered. Pt is tolerating oral fluids. Vital signs are stable on room air. Resting in bed comfortably. Plan is to work with pt and ot 10/5, plan to go home with home health. Will monitor over night.

## 2022-10-05 NOTE — PROGRESS NOTES
[facility] Progress Note    Subjective: having zero pain. Tolerating diet, voiding and cleared therapy    Objective: sitting up in bedside chair, dressed ready to go  RLE NVI, CDI dressing      Assessment/Plan: 67year old female postop day #1 status post R TKA.   Doing exceptionally well despite medical hx including HTN, osteoporosis, hyperlipidemia  -Weightbearing as tolerated, PT OT  -DVT prophylaxis mechanical plus aspirin twice daily  -Pain control with oral meds  -Perioperative Ancef completed  Disposition: dc home    Bhumika Bang MD, [address] Orthopedic Surgery  Phone [phone]  Fax [phone]

## 2022-10-05 NOTE — PLAN OF CARE
Post-op day 1. Alert and oriented x4. Surgical site is clean, dry, and intact with dressing in place. Spandigrips placed. PT/OT evaluations completed. Weightbearing as tolerated with walker. Discharge orders placed. Provided discharge packet that included medications to continue, new medications, follow-up appointments, and post-surgical instructions. Meds to beds. Patient discharged home with [facility].

## 2022-10-05 NOTE — PHYSICAL THERAPY NOTE
PHYSICAL THERAPY TREATMENT NOTE - INPATIENT    Room Number: 383/383-A     Session: 1     Number of Visits to Meet Established Goals: 1    Presenting Problem: Elective (R) TKA  Co-Morbidities : osteoporosis, HTN  ASSESSMENT     Pt able to progress toward functional goals to include gait , stair, stoop and car t/f training c CGA to supervision, RW and no LOB noted. Pt demonstrates R decreased stance time , denies pain and is able to correct gait deviations c v/c's. Pt has met all inpatient PT goals. The AM-PAC '6-Clicks' Inpatient Basic Mobility Short Form was completed and this patient is demonstrating a 21% degree of impairment in mobility. Research supports that patients with this level of impairment may benefit from home c HHPT . Pt confirms that her friend will stay with her to assist as needed at d/c .       DISCHARGE RECOMMENDATIONS  PT Discharge Recommendations: Home with home health PT; Intermittent Supervision     PLAN  PT Treatment Plan: Bed mobility;Balance training;Transfer training;Stair training;Strengthening;Stoop training;Gait training;Family education;Patient education; Energy conservation; Endurance  Rehab Potential : Good  Frequency (Obs): Daily    CURRENT GOALS       Goal #1 Patient is able to demonstrate supine - sit EOB @ level: independent  met   Goal #2 Patient is able to demonstrate transfers Sit to/from Stand at assistance level: modified independent  Met       Goal #3 Patient is able to ambulate 200 feet with assist device: least restrictive at assistance level: supervision  Met       Goal #4 Pt is able to ascend and descend 4 steps with supervision.    Met    Goal #5     Goal #6     Goal Comments: Goals established on 10/4/2022      10/5/2022 all goals achieved       SUBJECTIVE  \"This time around was so much better than my last knee\"     OBJECTIVE  Precautions: Bed/chair alarm    WEIGHT BEARING RESTRICTION  Weight Bearing Restriction: R lower extremity        R Lower Extremity: Weight Bearing as Tolerated       PAIN ASSESSMENT   Ratin  Location: pt denies pain        BALANCE                                                                                                                       Static Sitting: Normal  Dynamic Sitting: Good           Static Standing: Fair -  Dynamic Standing: Fair -    ACTIVITY TOLERANCE                         O2 WALK         AM-PAC '6-Clicks' INPATIENT SHORT FORM - BASIC MOBILITY  How much difficulty does the patient currently have. .. Patient Difficulty: Turning over in bed (including adjusting bedclothes, sheets and blankets)?: None   Patient Difficulty: Sitting down on and standing up from a chair with arms (e.g., wheelchair, bedside commode, etc.): None   Patient Difficulty: Moving from lying on back to sitting on the side of the bed?: None   How much help from another person does the patient currently need. .. Help from Another: Moving to and from a bed to a chair (including a wheelchair)?: None   Help from Another: Need to walk in hospital room?: A Little   Help from Another: Climbing 3-5 steps with a railing?: A Little       AM-PAC Score:  Raw Score: 22   Approx Degree of Impairment: 20.91%   Standardized Score (AM-PAC Scale): 53.28   CMS Modifier (G-Code): CJ    FUNCTIONAL ABILITY STATUS  Gait Assessment   Functional Mobility/Gait Assessment  Gait Assistance: Supervision  Distance (ft): 10x2,150x2  Assistive Device: Rolling walker  Pattern: R Decreased stance time  Stairs: Stairs;Stoop/curb; Car transfer  How Many Stairs: 4  Device: 2 Rails  Assist: Supervision  Pattern: Ascend and Descend  Ascend and Descend : Step to  Stoop/Curb: CGA   Car transfer: supervision     Skilled Therapy Provided  Pt presents in bathroom. Toilet t/f mod I, supervision at sink for hand hygiene. Pt gait trained c RW , cues for reciprocal gait with good return demo.    Pt performed car/tub, stoop and stair training c CGA to supervision and cues for sequencing with good return demo.  Pt performed seated and standing therex per total knee protocol and cues for body mechanics with good return demo. Significance of achieving good ROM in a timely fashion explained. Pt performed bed mob c mod I. Pt left in bed, needs met alarm set. Bed Mobility:  Rolling right: nt   Rolling left: nt    Supine<>Sit: nt   Sit<>Supine: ind      Transfer Mobility:  Sit<>Stand: supervision cues for hand placement    Stand<>Sit: supervision    Gait: supervision           THERAPEUTIC EXERCISES  Lower Extremity Ankle pumps  Heel raises  Heel slides  mini squats, standing knee flexion      Upper Extremity      Position Sitting & Standing     Repetitions   10   Sets   1     Patient End of Session: In bed;Needs met;Call light within reach;RN aware of session/findings; All patient questions and concerns addressed;SCDs in place; Ice applied; Alarm set

## 2022-10-19 ENCOUNTER — OFFICE VISIT (OUTPATIENT)
Dept: ORTHOPEDICS CLINIC | Facility: CLINIC | Age: 72
End: 2022-10-19
Payer: MEDICARE

## 2022-10-19 VITALS — OXYGEN SATURATION: 99 % | HEART RATE: 81 BPM

## 2022-10-19 DIAGNOSIS — Z96.651 STATUS POST RIGHT KNEE REPLACEMENT: Primary | ICD-10-CM

## 2022-10-19 PROCEDURE — 1111F DSCHRG MED/CURRENT MED MERGE: CPT | Performed by: PHYSICIAN ASSISTANT

## 2022-10-19 PROCEDURE — 99024 POSTOP FOLLOW-UP VISIT: CPT | Performed by: PHYSICIAN ASSISTANT

## 2022-10-19 NOTE — PROGRESS NOTES
[facility] Progress Note    Subjective: Patient returns to clinic status post right total knee arthroplasty performed on 10/4/2022. She has been doing very well and denies any significant pain in the knee. She reports physical therapy at home has been going well and she will be initiating outpatient physical therapy tomorrow. Continues to take aspirin for DVT prophylaxis. Has been taking Tylenol, tramadol, Celebrex for her pain control and does not need any refills today. Denies any fevers, chills, or night sweats. No redness or drainage around the incision concerning for infection. Objective: Patient is seated comfortably in the exam chair. Alert and oriented x3. Nonlabored breathing. Grossly neurologically intact. Ambulating with a cane. Incision is clean, dry, and intact with staples in place. No redness or drainage concerning for infection. Calves are soft and nontender. Assessment/Plan: Patient doing very well approximately 15 days status post right total knee arthroplasty performed on 10/4/2022. Staples were removed in clinic today. The incision was swabbed with Betadine, Mastisol was applied to either side of the incision, and Steri-Strips were applied over the incision. I instructed the patient to avoid showering for the next 2 days to allow the staple sites to reepithelialize. I also recommended avoiding soaking the incision in a pool or tub until the patient is 6 weeks postop. Advised the patient to continue to take aspirin until she is 6 weeks postop. We will plan to follow-up in 4 weeks with Dr. Susu Najera or earlier if any concern. The patient is happy with the plan and will follow as advised. Leo Andrea PA-C  [facility]    This note was dictated using Dragon software. While it was briefly proofread prior to completion, some grammatical, spelling, and word choice errors due to dictation may still occur.

## 2022-10-20 ENCOUNTER — OFFICE VISIT (OUTPATIENT)
Dept: PHYSICAL THERAPY | Age: 72
End: 2022-10-20
Attending: ORTHOPAEDIC SURGERY
Payer: MEDICARE

## 2022-10-20 DIAGNOSIS — M17.11 ARTHRITIS OF RIGHT KNEE: ICD-10-CM

## 2022-10-20 PROCEDURE — 97110 THERAPEUTIC EXERCISES: CPT

## 2022-10-20 PROCEDURE — 97161 PT EVAL LOW COMPLEX 20 MIN: CPT

## 2022-10-21 ENCOUNTER — TELEPHONE (OUTPATIENT)
Dept: ORTHOPEDICS CLINIC | Facility: CLINIC | Age: 72
End: 2022-10-21

## 2022-10-21 NOTE — TELEPHONE ENCOUNTER
I attempted to put in an order but it requires that a face-to-face encounter has happened with us in the past 60 days.    However, the order from 2 Mar 21 should be active for a whole year.    I recommend that the patient call [facility] ([phone]) to enquire about more equipment. Otherwise, insurance would require him to come and see us as far as I can tell.    Reviewing his chart, it also appears that he has not yet had his COVID or flu shots. I highly encourage him to get these and we'd be happy to see him in clinic for this as well.    Andre Daley III, MD, FAAFP  [facility]  10/28/21 4:17 PM     Spoke with patient who stated that she did not want to take the Oxycodone or Tramadol for her post op pain, but wanted to know if it was okay she continue Tylenol. Pt notified she can continue this medication, especially if it helps relieve her pain. Pt verbalized understanding. No further questions at this time.

## 2022-10-21 NOTE — TELEPHONE ENCOUNTER
Patient just wanted to know if the medication that was prescribed to her: Tramadol and Tylenol does make you feel sleepy. Please advise. Thanks.     Patient can be reached at [phone]

## 2022-10-24 ENCOUNTER — OFFICE VISIT (OUTPATIENT)
Dept: PHYSICAL THERAPY | Age: 72
End: 2022-10-24
Attending: ORTHOPAEDIC SURGERY
Payer: MEDICARE

## 2022-10-24 DIAGNOSIS — M17.11 ARTHRITIS OF RIGHT KNEE: ICD-10-CM

## 2022-10-24 PROCEDURE — 97112 NEUROMUSCULAR REEDUCATION: CPT

## 2022-10-24 PROCEDURE — 97140 MANUAL THERAPY 1/> REGIONS: CPT

## 2022-10-24 PROCEDURE — 97110 THERAPEUTIC EXERCISES: CPT

## 2022-10-27 ENCOUNTER — OFFICE VISIT (OUTPATIENT)
Dept: PHYSICAL THERAPY | Age: 72
End: 2022-10-27
Attending: ORTHOPAEDIC SURGERY
Payer: MEDICARE

## 2022-10-27 DIAGNOSIS — M17.11 ARTHRITIS OF RIGHT KNEE: ICD-10-CM

## 2022-10-27 PROCEDURE — 97110 THERAPEUTIC EXERCISES: CPT

## 2022-10-27 PROCEDURE — 97112 NEUROMUSCULAR REEDUCATION: CPT

## 2022-10-31 ENCOUNTER — OFFICE VISIT (OUTPATIENT)
Dept: PHYSICAL THERAPY | Age: 72
End: 2022-10-31
Attending: ORTHOPAEDIC SURGERY
Payer: MEDICARE

## 2022-10-31 DIAGNOSIS — M17.11 ARTHRITIS OF RIGHT KNEE: ICD-10-CM

## 2022-10-31 PROCEDURE — 97112 NEUROMUSCULAR REEDUCATION: CPT

## 2022-10-31 PROCEDURE — 97110 THERAPEUTIC EXERCISES: CPT

## 2022-11-03 ENCOUNTER — OFFICE VISIT (OUTPATIENT)
Dept: PHYSICAL THERAPY | Age: 72
End: 2022-11-03
Attending: ORTHOPAEDIC SURGERY
Payer: MEDICARE

## 2022-11-03 DIAGNOSIS — M17.11 ARTHRITIS OF RIGHT KNEE: ICD-10-CM

## 2022-11-03 PROCEDURE — 97110 THERAPEUTIC EXERCISES: CPT

## 2022-11-07 ENCOUNTER — OFFICE VISIT (OUTPATIENT)
Dept: PHYSICAL THERAPY | Age: 72
End: 2022-11-07
Attending: ORTHOPAEDIC SURGERY
Payer: MEDICARE

## 2022-11-07 DIAGNOSIS — M17.11 ARTHRITIS OF RIGHT KNEE: ICD-10-CM

## 2022-11-07 PROCEDURE — 97110 THERAPEUTIC EXERCISES: CPT

## 2022-11-10 ENCOUNTER — OFFICE VISIT (OUTPATIENT)
Dept: PHYSICAL THERAPY | Age: 72
End: 2022-11-10
Attending: ORTHOPAEDIC SURGERY
Payer: MEDICARE

## 2022-11-10 DIAGNOSIS — M17.11 ARTHRITIS OF RIGHT KNEE: ICD-10-CM

## 2022-11-10 PROCEDURE — 97110 THERAPEUTIC EXERCISES: CPT

## 2022-11-14 ENCOUNTER — OFFICE VISIT (OUTPATIENT)
Dept: PHYSICAL THERAPY | Age: 72
End: 2022-11-14
Attending: ORTHOPAEDIC SURGERY
Payer: MEDICARE

## 2022-11-14 DIAGNOSIS — M17.11 ARTHRITIS OF RIGHT KNEE: ICD-10-CM

## 2022-11-14 PROCEDURE — 97110 THERAPEUTIC EXERCISES: CPT

## 2022-11-16 ENCOUNTER — TELEPHONE (OUTPATIENT)
Dept: ORTHOPEDICS CLINIC | Facility: CLINIC | Age: 72
End: 2022-11-16

## 2022-11-16 NOTE — TELEPHONE ENCOUNTER
Spoke with patient who wanted to know if she was able to drive after surgery. Pt notified that she needs to be off of pain medication (she is currently only taking Tylenol), and able to safely maneuever between the brake and gas pedal to be able to drive. Pt verbalized understanding. No further questions at this time.

## 2022-11-17 ENCOUNTER — OFFICE VISIT (OUTPATIENT)
Dept: PHYSICAL THERAPY | Age: 72
End: 2022-11-17
Attending: ORTHOPAEDIC SURGERY
Payer: MEDICARE

## 2022-11-17 DIAGNOSIS — M17.11 ARTHRITIS OF RIGHT KNEE: ICD-10-CM

## 2022-11-17 PROCEDURE — 97110 THERAPEUTIC EXERCISES: CPT

## 2022-11-17 PROCEDURE — 97112 NEUROMUSCULAR REEDUCATION: CPT

## 2022-11-21 ENCOUNTER — OFFICE VISIT (OUTPATIENT)
Dept: PHYSICAL THERAPY | Age: 72
End: 2022-11-21
Attending: ORTHOPAEDIC SURGERY
Payer: MEDICARE

## 2022-11-21 DIAGNOSIS — M17.11 ARTHRITIS OF RIGHT KNEE: ICD-10-CM

## 2022-11-21 PROCEDURE — 97110 THERAPEUTIC EXERCISES: CPT

## 2022-11-28 ENCOUNTER — APPOINTMENT (OUTPATIENT)
Dept: PHYSICAL THERAPY | Age: 72
End: 2022-11-28
Attending: ORTHOPAEDIC SURGERY
Payer: MEDICARE

## 2022-12-01 ENCOUNTER — APPOINTMENT (OUTPATIENT)
Dept: PHYSICAL THERAPY | Age: 72
End: 2022-12-01
Attending: ORTHOPAEDIC SURGERY

## 2022-12-02 ENCOUNTER — TELEPHONE (OUTPATIENT)
Dept: ORTHOPEDICS CLINIC | Facility: CLINIC | Age: 72
End: 2022-12-02

## 2022-12-02 DIAGNOSIS — Z96.651 STATUS POST RIGHT KNEE REPLACEMENT: Primary | ICD-10-CM

## 2022-12-05 ENCOUNTER — HOSPITAL ENCOUNTER (OUTPATIENT)
Dept: GENERAL RADIOLOGY | Age: 72
Discharge: HOME | End: 2022-12-05
Attending: ORTHOPAEDIC SURGERY
Payer: MEDICARE

## 2022-12-05 ENCOUNTER — OFFICE VISIT (OUTPATIENT)
Dept: ORTHOPEDICS CLINIC | Facility: CLINIC | Age: 72
End: 2022-12-05
Payer: MEDICARE

## 2022-12-05 VITALS — HEART RATE: 95 BPM | OXYGEN SATURATION: 99 %

## 2022-12-05 DIAGNOSIS — Z96.651 STATUS POST RIGHT KNEE REPLACEMENT: ICD-10-CM

## 2022-12-05 DIAGNOSIS — Z96.651 STATUS POST RIGHT KNEE REPLACEMENT: Primary | ICD-10-CM

## 2022-12-05 PROCEDURE — 77073 BONE LENGTH STUDIES: CPT | Performed by: ORTHOPAEDIC SURGERY

## 2022-12-05 PROCEDURE — 99024 POSTOP FOLLOW-UP VISIT: CPT | Performed by: ORTHOPAEDIC SURGERY

## 2022-12-05 PROCEDURE — 73562 X-RAY EXAM OF KNEE 3: CPT | Performed by: ORTHOPAEDIC SURGERY

## 2022-12-05 NOTE — PROGRESS NOTES
[facility] Progress Note    Subjective: Patient returns to clinic 2 months postop from right knee replacement. She states that she is continuing to improve and do well. She does not have much pain with the knee, only taking Tylenol as needed. She has done with aspirin DVT prophylaxis. She walks largely without assist device, occasionally brings a cane with just in case. She is working with physical therapy still. Therapy notes motion with flexion to around 105 following warming up. Objective: Patient is awake alert no distress. She demonstrates extension just about full, flexion to around 100. Knee is stable to varus and valgus throughout range of motion, appropriate throughout range of motion, anterior drawer 5 mm or less. Imaging: X-rays of the right knee personally viewed, independently interpreted and radiology report read. Demonstrates cemented right total knee arthroplasty unchanged from postop films. Patella tracking centrally. 6 degrees posterior slope of the tibial component      Assessment/Plan: 18-year-old female 2 months postop from right knee replacement. Patient doing well, improving. She is not having pain. She does not have much in the way of functional limitations. Her main focus at this point is continue to work on flexion. Per therapy notes she was flexing to close to 105 degrees. Patient does report that her motion is similar to what she had after her left knee replacement. Did encourage her to continue working on range of motion and demonstrated flexion and extension exercises that she can work on on her own. Did discuss follow-up 1 year from date of surgery but she will contact us sooner if any questions or issues.     Anita Jean MD, [address] [place] [facility]  Phone [phone]  Fax [phone]

## 2022-12-09 ENCOUNTER — APPOINTMENT (OUTPATIENT)
Dept: MRI IMAGING | Age: 72
End: 2022-12-09
Attending: EMERGENCY MEDICINE
Payer: MEDICARE

## 2022-12-09 ENCOUNTER — TELEPHONE (OUTPATIENT)
Dept: FAMILY MEDICINE CLINIC | Facility: CLINIC | Age: 72
End: 2022-12-09

## 2022-12-09 ENCOUNTER — APPOINTMENT (OUTPATIENT)
Dept: PHYSICAL THERAPY | Age: 72
End: 2022-12-09
Attending: ORTHOPAEDIC SURGERY

## 2022-12-09 ENCOUNTER — HOSPITAL ENCOUNTER (EMERGENCY)
Age: 72
Discharge: HOME | End: 2022-12-09
Attending: EMERGENCY MEDICINE
Payer: MEDICARE

## 2022-12-09 ENCOUNTER — TELEPHONE (OUTPATIENT)
Dept: ORTHOPEDICS CLINIC | Facility: CLINIC | Age: 72
End: 2022-12-09

## 2022-12-09 ENCOUNTER — TELEPHONE (OUTPATIENT)
Dept: PHYSICAL THERAPY | Age: 72
End: 2022-12-09

## 2022-12-09 VITALS
TEMPERATURE: 98 F | RESPIRATION RATE: 18 BRPM | OXYGEN SATURATION: 99 % | BODY MASS INDEX: 21.9 KG/M2 | HEART RATE: 63 BPM | SYSTOLIC BLOOD PRESSURE: 146 MMHG | WEIGHT: 116 LBS | HEIGHT: 61 IN | DIASTOLIC BLOOD PRESSURE: 61 MMHG

## 2022-12-09 DIAGNOSIS — E86.0 DEHYDRATION: ICD-10-CM

## 2022-12-09 DIAGNOSIS — E87.1 HYPONATREMIA: ICD-10-CM

## 2022-12-09 DIAGNOSIS — R41.3 MEMORY DYSFUNCTION: ICD-10-CM

## 2022-12-09 DIAGNOSIS — N30.90 CYSTITIS: Primary | ICD-10-CM

## 2022-12-09 DIAGNOSIS — E87.6 HYPOKALEMIA: ICD-10-CM

## 2022-12-09 LAB
ALBUMIN SERPL-MCNC: 4.2 G/DL (ref 3.4–5)
ALBUMIN/GLOB SERPL: 1.3 {RATIO} (ref 1–2)
ALP LIVER SERPL-CCNC: 75 U/L
ALT SERPL-CCNC: 23 U/L
ANION GAP SERPL CALC-SCNC: 7 MMOL/L (ref 0–18)
AST SERPL-CCNC: 13 U/L (ref 15–37)
BASOPHILS # BLD AUTO: 0.02 X10(3) UL (ref 0–0.2)
BASOPHILS NFR BLD AUTO: 0.4 %
BILIRUB SERPL-MCNC: 0.5 MG/DL (ref 0.1–2)
BILIRUB UR QL STRIP.AUTO: NEGATIVE
BUN BLD-MCNC: 14 MG/DL (ref 7–18)
CALCIUM BLD-MCNC: 9.3 MG/DL (ref 8.5–10.1)
CHLORIDE SERPL-SCNC: 96 MMOL/L (ref 98–112)
CLARITY UR REFRACT.AUTO: CLEAR
CO2 SERPL-SCNC: 29 MMOL/L (ref 21–32)
COLOR UR AUTO: YELLOW
CREAT BLD-MCNC: 0.65 MG/DL
EOSINOPHIL # BLD AUTO: 0.01 X10(3) UL (ref 0–0.7)
EOSINOPHIL NFR BLD AUTO: 0.2 %
ERYTHROCYTE [DISTWIDTH] IN BLOOD BY AUTOMATED COUNT: 14 %
GFR SERPLBLD BASED ON 1.73 SQ M-ARVRAT: 93 ML/MIN/1.73M2 (ref 60–?)
GLOBULIN PLAS-MCNC: 3.3 G/DL (ref 2.8–4.4)
GLUCOSE BLD-MCNC: 115 MG/DL (ref 70–99)
GLUCOSE UR STRIP.AUTO-MCNC: NEGATIVE MG/DL
HCT VFR BLD AUTO: 39.2 %
HGB BLD-MCNC: 13.7 G/DL
IMM GRANULOCYTES # BLD AUTO: 0.02 X10(3) UL (ref 0–1)
IMM GRANULOCYTES NFR BLD: 0.4 %
KETONES UR STRIP.AUTO-MCNC: NEGATIVE MG/DL
LYMPHOCYTES # BLD AUTO: 0.8 X10(3) UL (ref 1–4)
LYMPHOCYTES NFR BLD AUTO: 16.6 %
MAGNESIUM SERPL-MCNC: 2.1 MG/DL (ref 1.6–2.6)
MCH RBC QN AUTO: 30.6 PG (ref 26–34)
MCHC RBC AUTO-ENTMCNC: 34.9 G/DL (ref 31–37)
MCV RBC AUTO: 87.7 FL
MONOCYTES # BLD AUTO: 0.34 X10(3) UL (ref 0.1–1)
MONOCYTES NFR BLD AUTO: 7.1 %
NEUTROPHILS # BLD AUTO: 3.63 X10 (3) UL (ref 1.5–7.7)
NEUTROPHILS # BLD AUTO: 3.63 X10(3) UL (ref 1.5–7.7)
NEUTROPHILS NFR BLD AUTO: 75.3 %
NITRITE UR QL STRIP.AUTO: NEGATIVE
OSMOLALITY SERPL CALC.SUM OF ELEC: 275 MOSM/KG (ref 275–295)
PH UR STRIP.AUTO: 6.5 [PH] (ref 5–8)
PLATELET # BLD AUTO: 297 10(3)UL (ref 150–450)
POTASSIUM SERPL-SCNC: 3.3 MMOL/L (ref 3.5–5.1)
PROT SERPL-MCNC: 7.5 G/DL (ref 6.4–8.2)
PROT UR STRIP.AUTO-MCNC: NEGATIVE MG/DL
RBC # BLD AUTO: 4.47 X10(6)UL
RBC UR QL AUTO: NEGATIVE
SODIUM SERPL-SCNC: 132 MMOL/L (ref 136–145)
SP GR UR STRIP.AUTO: 1.02 (ref 1–1.03)
UROBILINOGEN UR STRIP.AUTO-MCNC: 0.2 MG/DL
WBC # BLD AUTO: 4.8 X10(3) UL (ref 4–11)
YEAST UR QL: PRESENT /HPF

## 2022-12-09 PROCEDURE — 81001 URINALYSIS AUTO W/SCOPE: CPT | Performed by: EMERGENCY MEDICINE

## 2022-12-09 PROCEDURE — 87086 URINE CULTURE/COLONY COUNT: CPT | Performed by: EMERGENCY MEDICINE

## 2022-12-09 PROCEDURE — 85025 COMPLETE CBC W/AUTO DIFF WBC: CPT | Performed by: EMERGENCY MEDICINE

## 2022-12-09 PROCEDURE — 99285 EMERGENCY DEPT VISIT HI MDM: CPT

## 2022-12-09 PROCEDURE — 83735 ASSAY OF MAGNESIUM: CPT | Performed by: EMERGENCY MEDICINE

## 2022-12-09 PROCEDURE — 96360 HYDRATION IV INFUSION INIT: CPT

## 2022-12-09 PROCEDURE — A9575 INJ GADOTERATE MEGLUMI 0.1ML: HCPCS | Performed by: EMERGENCY MEDICINE

## 2022-12-09 PROCEDURE — 80053 COMPREHEN METABOLIC PANEL: CPT | Performed by: EMERGENCY MEDICINE

## 2022-12-09 PROCEDURE — 99284 EMERGENCY DEPT VISIT MOD MDM: CPT

## 2022-12-09 PROCEDURE — 70553 MRI BRAIN STEM W/O & W/DYE: CPT | Performed by: EMERGENCY MEDICINE

## 2022-12-09 PROCEDURE — 81015 MICROSCOPIC EXAM OF URINE: CPT | Performed by: EMERGENCY MEDICINE

## 2022-12-09 RX ORDER — POTASSIUM CHLORIDE 20 MEQ/1
40 TABLET, EXTENDED RELEASE ORAL ONCE
Status: COMPLETED | OUTPATIENT
Start: 2022-12-09 | End: 2022-12-09

## 2022-12-09 RX ORDER — POTASSIUM CHLORIDE 20 MEQ/1
20 TABLET, EXTENDED RELEASE ORAL 2 TIMES DAILY
Qty: 20 TABLET | Refills: 0 | Status: SHIPPED | OUTPATIENT
Start: 2022-12-09 | End: 2022-12-19

## 2022-12-09 RX ORDER — SULFAMETHOXAZOLE AND TRIMETHOPRIM 800; 160 MG/1; MG/1
1 TABLET ORAL 2 TIMES DAILY
Qty: 14 TABLET | Refills: 0 | Status: SHIPPED | OUTPATIENT
Start: 2022-12-09 | End: 2022-12-16

## 2022-12-09 RX ORDER — GADOTERATE MEGLUMINE 376.9 MG/ML
15 INJECTION INTRAVENOUS
Status: COMPLETED | OUTPATIENT
Start: 2022-12-09 | End: 2022-12-09

## 2022-12-09 NOTE — TELEPHONE ENCOUNTER
Patient called and said that something isn't quite right. She is feeling very disoriented and getting things mixed up. Patient would like a call back.  Please advise

## 2022-12-09 NOTE — DISCHARGE INSTRUCTIONS
Drink plenty of fluids - especially low-calorie sports drinks like Gatorade. Do not drink only water as that will dilute your electrolytes    I strongly recommend you be evaluated thoroughly by a neurologist for the possibility of early dementia. I also strongly believe you should discuss this possible diagnosis with your children in case there is worsening.

## 2022-12-09 NOTE — ED INITIAL ASSESSMENT (HPI)
Per friend, for the last 2 weeks has been confused but woke up more confused today. Pt states she doesn't know if she's dreaming. Easily aggravated per friend. pt is worried if she has dementia. Had knee surgery few months ago. no falls. Denies of hitting head.

## 2022-12-09 NOTE — TELEPHONE ENCOUNTER
Pt called stating she has been disoriented and not remembering things correctly since knee surgery October 4, 22.  Requesting appt/advice

## 2022-12-09 NOTE — TELEPHONE ENCOUNTER
Called pt because she wanted an earlier time today if possible.   Called to offer for her to come in anytime between 11:00am and 11:30am.

## 2022-12-09 NOTE — TELEPHONE ENCOUNTER
S  Spoke with patient who stated that she is very disoriented. Confusion with dreams or wakefulness. B  Pt has been treated recently for 2 month post op from right knee replacement. Pt has been complaining of disorientation. Pt's last office visit was on 22. A  Pt reports having issue with distinction between being awake or her feeling as if she were asleep and dreaming. Pt denies having experienced this in the past. Oriented to time and place. Patient able to identify her name and . No shortness of breath. No issues with her knee. No pain. No fever or rash. Denies any heart palpitations. Patient has contact her PCP for further advice. Pt requesting to know if this can be related to her surgery. R  RN advised patient to contact her PCP for advise or go to the ER for evaluation if symptoms change or worsen. Advised patient that if no improvement/worsening symptoms to contact our office back in 1 days. Pt aware that Dr. Andreina Nicholas team will be made aware of this call. Pt verbalized understanding. No further questions at this time.   Future Appointments   Date Time Provider [place]   2022  3:30 PM Glenys Johnston PT [facility] [place]   2022  9:00 AM Glenys Johnston PT [facility] PHYS  [place]   2022 10:00 AM Glenys Johnston PT [facility] [place]   10/4/2023  2:00 PM Hebert Leo MD [facility]

## 2022-12-12 ENCOUNTER — TELEPHONE (OUTPATIENT)
Dept: FAMILY MEDICINE CLINIC | Facility: CLINIC | Age: 72
End: 2022-12-12

## 2022-12-12 NOTE — TELEPHONE ENCOUNTER
I did speak with Destini Eubanks. He again is concerned of mental state of patient. Went to ER on Friday. Saturday, her mental state was worse than Friday. Sunday, she seemed normal. She visited with friends. Today, she woke up again very confused. She stated \"no one is real\". Also stated \"Putin and I have destroyed the world and now I'm being punished\". She is refusing any meds or foods or fluids at this time. She is steady on her feet. He states physically, she seems fine. I did advise him to contact her sons. Also advised most likely, he will need to bring her back to ER. Please advise.

## 2022-12-13 ENCOUNTER — APPOINTMENT (OUTPATIENT)
Dept: PHYSICAL THERAPY | Age: 72
End: 2022-12-13
Attending: ORTHOPAEDIC SURGERY

## 2022-12-13 ENCOUNTER — TELEPHONE (OUTPATIENT)
Dept: NEUROLOGY | Facility: CLINIC | Age: 72
End: 2022-12-13

## 2022-12-13 NOTE — TELEPHONE ENCOUNTER
Message left on [place]'s VM to offer jerald't today in [place] with Dr Braulio Zacarias at 3:00. Spoke with [facility] (patient's other son) who states will take 3pm jerald't but will call back by 12pm to confirm and get instructions after he speaks to Andrey greenfield.

## 2022-12-13 NOTE — TELEPHONE ENCOUNTER
Pts significant other Dimacoral Backlianne calling regarding pts symptoms. Pt has been to the ED due to memory loss, pt has been having hallucinations and is unaware of where she is. ED is referred her to a neurologist. Pt is scheduled for first available and placed on a waitlist. Pts significant other stated the ED suggested she see a neurologist ASAP. Please advise if this patient can be seen sooner, she can go to any location.

## 2022-12-16 ENCOUNTER — TELEPHONE (OUTPATIENT)
Dept: PHYSICAL THERAPY | Age: 72
End: 2022-12-16

## 2022-12-16 ENCOUNTER — APPOINTMENT (OUTPATIENT)
Dept: PHYSICAL THERAPY | Age: 72
End: 2022-12-16
Attending: ORTHOPAEDIC SURGERY

## 2022-12-16 NOTE — TELEPHONE ENCOUNTER
Called and spoke to pt's son. Pt is dealing with other medical issues right now and therapy will be put on hold until she is feeling better.

## 2022-12-19 ENCOUNTER — TELEPHONE (OUTPATIENT)
Dept: FAMILY MEDICINE CLINIC | Facility: CLINIC | Age: 72
End: 2022-12-19

## 2022-12-19 NOTE — TELEPHONE ENCOUNTER
It is understandable that this patient may not be able to come in for follow-up. Please ask friends/family to let us know if they need to cancel the appointment. Also, please encourage them to keep her follow-up appointment or appointments with her specialists.

## 2022-12-19 NOTE — TELEPHONE ENCOUNTER
Dottie Hare and her son, Stanford Arango will make every attempt to bring patient to the office. She was hospitalized for mental status changes and remains a little uncooperative. At this time, physically she is sitting up, ambulating to the bathroom when needed. Appetite is fair.

## 2022-12-19 NOTE — TELEPHONE ENCOUNTER
Pt's friend called and stated that pt has an apt for 12/20 for a hospital f/u. Stated that pt is having trouble getting up and moving around. Stated that if they are not able to get her up to come in person if they can do virtual. Doctor wants more info on the situation and what is going on with pt.

## 2022-12-20 ENCOUNTER — HOSPITAL ENCOUNTER (INPATIENT)
Facility: HOSPITAL | Age: 72
LOS: 3 days | Discharge: NURSING, REHAB OR LONG-TERM CARE | End: 2022-12-23
Attending: EMERGENCY MEDICINE | Admitting: HOSPITALIST
Payer: MEDICARE

## 2022-12-20 ENCOUNTER — TELEPHONE (OUTPATIENT)
Dept: FAMILY MEDICINE CLINIC | Facility: CLINIC | Age: 72
End: 2022-12-20

## 2022-12-20 ENCOUNTER — APPOINTMENT (OUTPATIENT)
Dept: MRI IMAGING | Facility: HOSPITAL | Age: 72
End: 2022-12-20
Attending: INTERNAL MEDICINE
Payer: MEDICARE

## 2022-12-20 ENCOUNTER — HOSPITAL ENCOUNTER (INPATIENT)
Facility: HOSPITAL | Age: 72
LOS: 3 days | Discharge: NURSING, REHAB OR LONG-TERM CARE | End: 2022-12-23
Attending: EMERGENCY MEDICINE
Payer: MEDICARE

## 2022-12-20 ENCOUNTER — APPOINTMENT (OUTPATIENT)
Dept: CT IMAGING | Facility: HOSPITAL | Age: 72
End: 2022-12-20
Attending: EMERGENCY MEDICINE
Payer: MEDICARE

## 2022-12-20 DIAGNOSIS — N39.0 ACUTE UTI: ICD-10-CM

## 2022-12-20 DIAGNOSIS — E87.1 HYPONATREMIA: ICD-10-CM

## 2022-12-20 DIAGNOSIS — M17.11 PRIMARY OSTEOARTHRITIS OF RIGHT KNEE: ICD-10-CM

## 2022-12-20 DIAGNOSIS — G93.40 ACUTE ENCEPHALOPATHY: Primary | ICD-10-CM

## 2022-12-20 LAB
AMPHET UR QL SCN: NEGATIVE
ANION GAP SERPL CALC-SCNC: 8 MMOL/L (ref 0–18)
BASOPHILS # BLD AUTO: 0.02 X10(3) UL (ref 0–0.2)
BASOPHILS NFR BLD AUTO: 0.4 %
BENZODIAZ UR QL SCN: NEGATIVE
BILIRUB UR QL STRIP.AUTO: NEGATIVE
BUN BLD-MCNC: 15 MG/DL (ref 7–18)
CALCIUM BLD-MCNC: 9.8 MG/DL (ref 8.5–10.1)
CANNABINOIDS UR QL SCN: NEGATIVE
CHLORIDE SERPL-SCNC: 95 MMOL/L (ref 98–112)
CO2 SERPL-SCNC: 26 MMOL/L (ref 21–32)
COCAINE UR QL: NEGATIVE
COLOR UR AUTO: YELLOW
CREAT BLD-MCNC: 0.82 MG/DL
CREAT UR-SCNC: 133 MG/DL
EOSINOPHIL # BLD AUTO: 0.12 X10(3) UL (ref 0–0.7)
EOSINOPHIL NFR BLD AUTO: 2.5 %
ERYTHROCYTE [DISTWIDTH] IN BLOOD BY AUTOMATED COUNT: 13.8 %
ETHANOL SERPL-MCNC: <3 MG/DL (ref ?–3)
GFR SERPLBLD BASED ON 1.73 SQ M-ARVRAT: 75 ML/MIN/1.73M2 (ref 60–?)
GLUCOSE BLD-MCNC: 97 MG/DL (ref 70–99)
GLUCOSE UR STRIP.AUTO-MCNC: NEGATIVE MG/DL
HCT VFR BLD AUTO: 46.2 %
HGB BLD-MCNC: 15.9 G/DL
HYALINE CASTS #/AREA URNS AUTO: PRESENT /LPF
IMM GRANULOCYTES # BLD AUTO: 0.02 X10(3) UL (ref 0–1)
IMM GRANULOCYTES NFR BLD: 0.4 %
KETONES UR STRIP.AUTO-MCNC: 40 MG/DL
LYMPHOCYTES # BLD AUTO: 0.62 X10(3) UL (ref 1–4)
LYMPHOCYTES NFR BLD AUTO: 12.8 %
MCH RBC QN AUTO: 30.2 PG (ref 26–34)
MCHC RBC AUTO-ENTMCNC: 34.4 G/DL (ref 31–37)
MCV RBC AUTO: 87.8 FL
MDMA UR QL SCN: NEGATIVE
MONOCYTES # BLD AUTO: 0.59 X10(3) UL (ref 0.1–1)
MONOCYTES NFR BLD AUTO: 12.2 %
NEUTROPHILS # BLD AUTO: 3.47 X10 (3) UL (ref 1.5–7.7)
NEUTROPHILS # BLD AUTO: 3.47 X10(3) UL (ref 1.5–7.7)
NEUTROPHILS NFR BLD AUTO: 71.7 %
NITRITE UR QL STRIP.AUTO: NEGATIVE
OPIATES UR QL SCN: NEGATIVE
OSMOLALITY SERPL CALC.SUM OF ELEC: 269 MOSM/KG (ref 275–295)
OXYCODONE UR QL SCN: NEGATIVE
PH UR STRIP.AUTO: 5.5 [PH] (ref 5–8)
PLATELET # BLD AUTO: 254 10(3)UL (ref 150–450)
POTASSIUM SERPL-SCNC: 3.8 MMOL/L (ref 3.5–5.1)
PROT UR STRIP.AUTO-MCNC: NEGATIVE MG/DL
RBC # BLD AUTO: 5.26 X10(6)UL
RBC #/AREA URNS AUTO: >10 /HPF
SARS-COV-2 RNA RESP QL NAA+PROBE: NOT DETECTED
SODIUM SERPL-SCNC: 129 MMOL/L (ref 136–145)
SP GR UR STRIP.AUTO: >=1.03 (ref 1–1.03)
UROBILINOGEN UR STRIP.AUTO-MCNC: 0.2 MG/DL
WBC # BLD AUTO: 4.8 X10(3) UL (ref 4–11)
YEAST UR QL: PRESENT /HPF

## 2022-12-20 PROCEDURE — 70450 CT HEAD/BRAIN W/O DYE: CPT | Performed by: EMERGENCY MEDICINE

## 2022-12-20 PROCEDURE — 70553 MRI BRAIN STEM W/O & W/DYE: CPT | Performed by: INTERNAL MEDICINE

## 2022-12-20 PROCEDURE — 99223 1ST HOSP IP/OBS HIGH 75: CPT | Performed by: INTERNAL MEDICINE

## 2022-12-20 RX ORDER — SODIUM CHLORIDE, SODIUM LACTATE, POTASSIUM CHLORIDE, CALCIUM CHLORIDE 600; 310; 30; 20 MG/100ML; MG/100ML; MG/100ML; MG/100ML
INJECTION, SOLUTION INTRAVENOUS CONTINUOUS
Status: ACTIVE | OUTPATIENT
Start: 2022-12-20 | End: 2022-12-21

## 2022-12-20 RX ORDER — ONDANSETRON 2 MG/ML
4 INJECTION INTRAMUSCULAR; INTRAVENOUS EVERY 6 HOURS PRN
Status: DISCONTINUED | OUTPATIENT
Start: 2022-12-20 | End: 2022-12-23

## 2022-12-20 RX ORDER — QUETIAPINE FUMARATE 25 MG/1
25 TABLET, FILM COATED ORAL NIGHTLY
Status: DISCONTINUED | OUTPATIENT
Start: 2022-12-20 | End: 2022-12-22

## 2022-12-20 RX ORDER — BISACODYL 10 MG
10 SUPPOSITORY, RECTAL RECTAL
Status: DISCONTINUED | OUTPATIENT
Start: 2022-12-20 | End: 2022-12-23

## 2022-12-20 RX ORDER — POLYETHYLENE GLYCOL 3350 17 G/17G
17 POWDER, FOR SOLUTION ORAL DAILY PRN
Status: DISCONTINUED | OUTPATIENT
Start: 2022-12-20 | End: 2022-12-23

## 2022-12-20 RX ORDER — ASPIRIN 81 MG/1
81 TABLET ORAL DAILY
COMMUNITY

## 2022-12-20 RX ORDER — SENNOSIDES 8.6 MG
17.2 TABLET ORAL NIGHTLY PRN
Status: DISCONTINUED | OUTPATIENT
Start: 2022-12-20 | End: 2022-12-23

## 2022-12-20 RX ORDER — ASPIRIN 81 MG/1
81 TABLET ORAL DAILY
Status: DISCONTINUED | OUTPATIENT
Start: 2022-12-21 | End: 2022-12-23

## 2022-12-20 RX ORDER — DIPHENHYDRAMINE HYDROCHLORIDE 50 MG/ML
10 INJECTION, SOLUTION INTRAMUSCULAR; INTRAVENOUS
Status: COMPLETED | OUTPATIENT
Start: 2022-12-20 | End: 2022-12-20

## 2022-12-20 RX ORDER — ACETAMINOPHEN 500 MG
500 TABLET ORAL EVERY 4 HOURS PRN
Status: DISCONTINUED | OUTPATIENT
Start: 2022-12-20 | End: 2022-12-23

## 2022-12-20 RX ORDER — MELATONIN
3 NIGHTLY PRN
Status: DISCONTINUED | OUTPATIENT
Start: 2022-12-20 | End: 2022-12-23

## 2022-12-20 RX ORDER — SODIUM PHOSPHATE, DIBASIC AND SODIUM PHOSPHATE, MONOBASIC 7; 19 G/133ML; G/133ML
1 ENEMA RECTAL ONCE AS NEEDED
Status: DISCONTINUED | OUTPATIENT
Start: 2022-12-20 | End: 2022-12-23

## 2022-12-20 RX ORDER — DIPHENHYDRAMINE HYDROCHLORIDE 50 MG/ML
25 INJECTION INTRAMUSCULAR; INTRAVENOUS ONCE
Status: COMPLETED | OUTPATIENT
Start: 2022-12-21 | End: 2022-12-21

## 2022-12-20 RX ORDER — ENOXAPARIN SODIUM 100 MG/ML
40 INJECTION SUBCUTANEOUS DAILY
Status: DISCONTINUED | OUTPATIENT
Start: 2022-12-21 | End: 2022-12-23

## 2022-12-20 NOTE — ED QUICK NOTES
Spoke w/ER ALISIA, no restriction of rights, petition, and/or cert. Came with pt; unable to find any scanned documents in chart; [facility] ER going to contact [facility] for followup to see if we need to fill them out. no vomiting/no nausea

## 2022-12-20 NOTE — ED INITIAL ASSESSMENT (HPI)
As per patient friend patient is psychotic and having delusions and hallucinations at home . Denies HI& SI.patient is calm and co-operative in the triage . sent from [facility]  For medical clearence.

## 2022-12-20 NOTE — ED QUICK NOTES
Orders for admission, patient is aware of plan and ready to go upstairs. Any questions, please call ED RN Braden Draper. at extension [phone]. Patient Covid vaccination status: Fully vaccinated     COVID Test Ordered in ED: Rapid SARS-CoV-2 by PCR    COVID Suspicion at Admission: Low clinical suspicion for COVID    Running Infusions:  n/a    Mental Status/LOC at time of transport: AOx4 withdrawn, soft spoken, ER ALISIA put in for psych consult when admitted; no petition, Radha Look. , or restriction of rights filled out till then [facility] unsure if petitionable; meds reconciled    Other pertinent information: items w/ER security;   CIWA score: N/A   NIH score:  N/A

## 2022-12-20 NOTE — TELEPHONE ENCOUNTER
Faxed over a release of information to [address] [place] [address] @ [phone] for patient's most recent discharge summary.

## 2022-12-21 ENCOUNTER — APPOINTMENT (OUTPATIENT)
Dept: PHYSICAL THERAPY | Age: 72
End: 2022-12-21
Attending: ORTHOPAEDIC SURGERY

## 2022-12-21 LAB
ANION GAP SERPL CALC-SCNC: 7 MMOL/L (ref 0–18)
BASOPHILS # BLD AUTO: 0.03 X10(3) UL (ref 0–0.2)
BASOPHILS NFR BLD AUTO: 0.8 %
BUN BLD-MCNC: 16 MG/DL (ref 7–18)
CALCIUM BLD-MCNC: 9 MG/DL (ref 8.5–10.1)
CHLORIDE SERPL-SCNC: 100 MMOL/L (ref 98–112)
CO2 SERPL-SCNC: 25 MMOL/L (ref 21–32)
CREAT BLD-MCNC: 0.56 MG/DL
EOSINOPHIL # BLD AUTO: 0.18 X10(3) UL (ref 0–0.7)
EOSINOPHIL NFR BLD AUTO: 5.1 %
ERYTHROCYTE [DISTWIDTH] IN BLOOD BY AUTOMATED COUNT: 13.6 %
GFR SERPLBLD BASED ON 1.73 SQ M-ARVRAT: 96 ML/MIN/1.73M2 (ref 60–?)
GLUCOSE BLD-MCNC: 95 MG/DL (ref 70–99)
HCT VFR BLD AUTO: 38.3 %
HGB BLD-MCNC: 13.3 G/DL
IMM GRANULOCYTES # BLD AUTO: 0.01 X10(3) UL (ref 0–1)
IMM GRANULOCYTES NFR BLD: 0.3 %
LYMPHOCYTES # BLD AUTO: 0.55 X10(3) UL (ref 1–4)
LYMPHOCYTES NFR BLD AUTO: 15.6 %
MCH RBC QN AUTO: 30.3 PG (ref 26–34)
MCHC RBC AUTO-ENTMCNC: 34.7 G/DL (ref 31–37)
MCV RBC AUTO: 87.2 FL
MONOCYTES # BLD AUTO: 0.55 X10(3) UL (ref 0.1–1)
MONOCYTES NFR BLD AUTO: 15.6 %
NEUTROPHILS # BLD AUTO: 2.21 X10 (3) UL (ref 1.5–7.7)
NEUTROPHILS # BLD AUTO: 2.21 X10(3) UL (ref 1.5–7.7)
NEUTROPHILS NFR BLD AUTO: 62.6 %
OSMOLALITY SERPL CALC.SUM OF ELEC: 275 MOSM/KG (ref 275–295)
PLATELET # BLD AUTO: 209 10(3)UL (ref 150–450)
POTASSIUM SERPL-SCNC: 4.1 MMOL/L (ref 3.5–5.1)
RBC # BLD AUTO: 4.39 X10(6)UL
SODIUM SERPL-SCNC: 132 MMOL/L (ref 136–145)
WBC # BLD AUTO: 3.5 X10(3) UL (ref 4–11)

## 2022-12-21 PROCEDURE — 99232 SBSQ HOSP IP/OBS MODERATE 35: CPT | Performed by: HOSPITALIST

## 2022-12-21 PROCEDURE — 90792 PSYCH DIAG EVAL W/MED SRVCS: CPT

## 2022-12-21 NOTE — PLAN OF CARE
Pt A&Ox4  RA;VSS  Denies pain   LR @ 100   Poor intake   IV rocephin  Staff will continue to monitor     1700- pt tolerating meals well. About 50%. Risperdal changed to Seroquel. Problem: CONFUSION  Goal: Confusion, delirium, dementia or psychosis is improved or at baseline  Description: INTERVENTIONS:  - Assess for possible contributors to thought disturbance, including medications, impaired vision or hearing, underlying metabolic abnormalities, dehydration, psychiatric diagnoses, and notify attending MD/LIP  - [place] high risk fall precautions, as indicated  - Provide frequent short contacts to provide reality reorientation, refocusing and direction  - Decrease environmental stimuli, including noise as appropriate  - Monitor and intervene to maintain adequate nutrition, hydration, elimination, sleep and activity  - Consider the need for a sitter if unable to leave patient unattended  - [address] consult as indicated  - Consult Pharmacy as needed  Outcome: Progressing     Problem: METABOLIC/FLUID AND ELECTROLYTES - ADULT  Goal: Electrolytes maintained within normal limits  Description: INTERVENTIONS:  - Monitor labs and rhythm and assess patient for signs and symptoms of electrolyte imbalances  - Administer electrolyte replacement as ordered  - Monitor response to electrolyte replacements, including rhythm and repeat lab results as appropriate  - Fluid restriction as ordered  - Instruct patient on fluid and nutrition restrictions as appropriate  Outcome: Progressing     Problem: NEUROLOGICAL - ADULT  Goal: Achieves stable or improved neurological status  Description: INTERVENTIONS  - Assess for and report changes in neurological status  - Initiate measures to prevent increased intracranial pressure  - Maintain blood pressure and fluid volume within ordered parameters to optimize cerebral perfusion and minimize risk of hemorrhage  - Monitor temperature, glucose, and sodium.  Initiate appropriate interventions as ordered  Outcome: Progressing     Problem: Impaired Cognition  Goal: Patient will exhibit improved attention, thought processing and/or memory  Description: Interventions:  - Allow additional time for processing after asking questions or providing instructions  Outcome: Progressing

## 2022-12-21 NOTE — PLAN OF CARE
A&Ox3-4. Answers all orientation questions appropriately but appears confused and withdrawn. Poor historian. Minimal to no eye contact when talking with patient. Very soft spoken. On room air, lungs clear. Abdomen soft and nontender, BS active. Denies N/V/D, denies pain or discomfort. Patient went down for MRI shortly after arriving to the unit. Returned and full assessment completed. Rash noted to buttocks and back of thighs with generalized sporadic spreading. MD notified and Benadryl 25 mg IVP x one ordered. Patient denies GERMAN. Brief placed- patient unable to identify if she is continent or not. C/o weakness- PT/OT to eval. IVF infusing- LR @ 100 mL/hr. Receiving IV Rocephin q24h for +UTI- urine cultures pending. Plan is for Dr. Collette Agarwal to see patient. Needs met. Will monitor.     Problem: CONFUSION  Goal: Confusion, delirium, dementia or psychosis is improved or at baseline  Description: INTERVENTIONS:  - Assess for possible contributors to thought disturbance, including medications, impaired vision or hearing, underlying metabolic abnormalities, dehydration, psychiatric diagnoses, and notify attending MD/LIP  - [place] high risk fall precautions, as indicated  - Provide frequent short contacts to provide reality reorientation, refocusing and direction  - Decrease environmental stimuli, including noise as appropriate  - Monitor and intervene to maintain adequate nutrition, hydration, elimination, sleep and activity  - Consider the need for a sitter if unable to leave patient unattended  - [address] consult as indicated  - Consult Pharmacy as needed  Outcome: Progressing     Problem: METABOLIC/FLUID AND ELECTROLYTES - ADULT  Goal: Electrolytes maintained within normal limits  Description: INTERVENTIONS:  - Monitor labs and rhythm and assess patient for signs and symptoms of electrolyte imbalances  - Administer electrolyte replacement as ordered  - Monitor response to electrolyte replacements, including rhythm and repeat lab results as appropriate  - Fluid restriction as ordered  - Instruct patient on fluid and nutrition restrictions as appropriate  Outcome: Progressing     Problem: NEUROLOGICAL - ADULT  Goal: Achieves stable or improved neurological status  Description: INTERVENTIONS  - Assess for and report changes in neurological status  - Initiate measures to prevent increased intracranial pressure  - Maintain blood pressure and fluid volume within ordered parameters to optimize cerebral perfusion and minimize risk of hemorrhage  - Monitor temperature, glucose, and sodium.  Initiate appropriate interventions as ordered  Outcome: Progressing     Problem: Impaired Cognition  Goal: Patient will exhibit improved attention, thought processing and/or memory  Description: Interventions:    Outcome: Progressing

## 2022-12-21 NOTE — ED QUICK NOTES
Called CTU floor where pt was being admitted with no answer.  Unable to give report; assigned RN unknown

## 2022-12-21 NOTE — PROGRESS NOTES
NURSING ADMISSION NOTE      Patient admitted via Cart  Oriented to room 9211  Safety precautions initiated. Bed in low position. Call light in reach. Received patient from ED. Admission navigator completed. Admission orders received. MRI screening completed and patient went down for MRI Brain shortly after arriving to floor.

## 2022-12-22 LAB
ANION GAP SERPL CALC-SCNC: 5 MMOL/L (ref 0–18)
BUN BLD-MCNC: 13 MG/DL (ref 7–18)
CALCIUM BLD-MCNC: 8.8 MG/DL (ref 8.5–10.1)
CHLORIDE SERPL-SCNC: 103 MMOL/L (ref 98–112)
CO2 SERPL-SCNC: 27 MMOL/L (ref 21–32)
CREAT BLD-MCNC: 0.43 MG/DL
FLUAV + FLUBV RNA SPEC NAA+PROBE: NEGATIVE
FLUAV + FLUBV RNA SPEC NAA+PROBE: NEGATIVE
GFR SERPLBLD BASED ON 1.73 SQ M-ARVRAT: 103 ML/MIN/1.73M2 (ref 60–?)
GLUCOSE BLD-MCNC: 97 MG/DL (ref 70–99)
OSMOLALITY SERPL CALC.SUM OF ELEC: 280 MOSM/KG (ref 275–295)
POTASSIUM SERPL-SCNC: 4.1 MMOL/L (ref 3.5–5.1)
RSV RNA SPEC NAA+PROBE: NEGATIVE
SARS-COV-2 RNA RESP QL NAA+PROBE: NOT DETECTED
SODIUM SERPL-SCNC: 135 MMOL/L (ref 136–145)

## 2022-12-22 PROCEDURE — 99232 SBSQ HOSP IP/OBS MODERATE 35: CPT | Performed by: HOSPITALIST

## 2022-12-22 PROCEDURE — 99232 SBSQ HOSP IP/OBS MODERATE 35: CPT

## 2022-12-22 RX ORDER — CIPROFLOXACIN 500 MG/1
500 TABLET, FILM COATED ORAL EVERY 12 HOURS
Status: DISCONTINUED | OUTPATIENT
Start: 2022-12-22 | End: 2022-12-23

## 2022-12-22 RX ORDER — OLANZAPINE 2.5 MG/1
2.5 TABLET ORAL NIGHTLY
Status: DISCONTINUED | OUTPATIENT
Start: 2022-12-22 | End: 2022-12-23

## 2022-12-22 NOTE — PROGRESS NOTES
Advised by psych APRN that patient is now medically cleared; awaiting documentation from hospitalist.  APRN is ordering repeat COVID test that behavioral health facilities will require. Placing petition and certificate on/in chart for transfer purposes. [facility] will be able to consider once repeat COVID comes back and they have bed availability. Will attempt to refer Sandra Hoyt to other geriatric units prior to close of business day for discharge planning.

## 2022-12-22 NOTE — PLAN OF CARE
66 y/o F. A&Ox4. RA. VSS. No tele. Patient denies pain/n/v at this time. Lactated Ringers infusing per MAR. Medications given per MAR. Patient resting. Plan of care to continue. Problem: CONFUSION  Goal: Confusion, delirium, dementia or psychosis is improved or at baseline  Description: INTERVENTIONS:  - Assess for possible contributors to thought disturbance, including medications, impaired vision or hearing, underlying metabolic abnormalities, dehydration, psychiatric diagnoses, and notify attending MD/LIP  - [place] high risk fall precautions, as indicated  - Provide frequent short contacts to provide reality reorientation, refocusing and direction  - Decrease environmental stimuli, including noise as appropriate  - Monitor and intervene to maintain adequate nutrition, hydration, elimination, sleep and activity  - Consider the need for a sitter if unable to leave patient unattended  - [address] consult as indicated  - Consult Pharmacy as needed  Outcome: Progressing        Problem: NEUROLOGICAL - ADULT  Goal: Achieves stable or improved neurological status  Description: INTERVENTIONS  - Assess for and report changes in neurological status  - Initiate measures to prevent increased intracranial pressure  - Maintain blood pressure and fluid volume within ordered parameters to optimize cerebral perfusion and minimize risk of hemorrhage  - Monitor temperature, glucose, and sodium.  Initiate appropriate interventions as ordered  Outcome: Progressing

## 2022-12-22 NOTE — CERTIFICATION
Ref: [address] [facility] 5/3-403, 5/3-602, 5/3-607, 5/3-610    5/3-702, 5/3-813, 5/4-306, 5/4-402, 5/4-403    5/4-405, 5/4-501, 5/4-611, 8/3-965   Inpatient Certificate  Re: Ayah Perez    (name)     I personally informed the above-named individual of the purpose of this examination and that he or she did not have to speak to me, and that any statements made might be related in court as to the individual's clinical condition or need for services. Additionally, if this examination was for the purpose of determining that the above-named individual is developmentally disabled and dangerous, I informed the individual of his or her right to speak with a relative, friend or  before the examination, and of his or her right to have an  appointed for him or her if he or she so desired. Electronically signed by Radha Wen MD    Signature of Examiner     On              December 22nd , 2022 , at     3:00  [] a.m.  [x] p.m.,  I personally examined the    (date)  (year)  (time)    above-named individual. The examination was conducted at [facility].  Based on the foregoing examination, it is my opinion that he or she is:  []  A person with mental illness who, because of his or her illness is reasonably expected, unless treated on an inpatient basis, to engage in conduct placing such person or another in physical harm or in reasonable expectation of being physically harmed;   [x]  A person with mental illness who, because of his or her illness is unable to provide for his or her basic physical needs so as to guard himself or herself from serious harm, without the assistance of family or others, unless treated on an inpatient basis;   []  A person with mental illness who: refuses treatment or is not adhering adequately to prescribed treatment; because of the nature of his or her illness is unable to understand his or her need for treatment; and if not treated on an inpatient basis, is reasonably expected based on his or her behavioral history, to suffer mental or emotional deterioration and is reasonably expected, after such deterioration, to meet the criteria of either paragraph one or paragraph two above;   []  An individual who is developmentally disabled and unless treated on an in-patient basis is reasonably expected to inflict serious physical harm upon himself or herself or others in the near future, and/or   [x]  Is in need of immediate hospitalization for the prevention of such harm. I base my opinion on the following (including clinical observations, factual information):  I examined the patient in person. 68 yo female with admitted on 12/20/22 for treatment of a UTI and hyponatremia. She is paranoid (\"I can't trust anyone\") and hears derogatory voices \"saying mean things to me\". 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 She is unable to care for herself at this time. Started on Seroquel.    I believe that the individual is subject to: []  Involuntary inpatient admission and is not in need of immediate hospitalization   (check one) [x]  Involuntary inpatient admission and is in need of immediate hospitalization    []  Judicial inpatient admission and is not in need of immediate hospitalization    []  Judicial inpatient admission and is in need of immediate hospitalization     Date: 12/22/2022 Signature: Electronically signed by Anderson Todd MD   Title: MD Printed Name: Billy Kinneymadebbie [id number] [phone] ([id number]) Inpatient Certificate    Printed by Pruffi

## 2022-12-23 ENCOUNTER — PATIENT OUTREACH (OUTPATIENT)
Dept: CASE MANAGEMENT | Age: 72
End: 2022-12-23

## 2022-12-23 VITALS
RESPIRATION RATE: 18 BRPM | DIASTOLIC BLOOD PRESSURE: 53 MMHG | BODY MASS INDEX: 19.99 KG/M2 | WEIGHT: 105.88 LBS | HEIGHT: 61 IN | HEART RATE: 78 BPM | OXYGEN SATURATION: 99 % | SYSTOLIC BLOOD PRESSURE: 137 MMHG | TEMPERATURE: 98 F

## 2022-12-23 PROBLEM — F32.9 MAJOR DEPRESSIVE DISORDER: Status: ACTIVE | Noted: 2022-12-23

## 2022-12-23 RX ORDER — OLANZAPINE 2.5 MG/1
2.5 TABLET ORAL NIGHTLY
Qty: 30 TABLET | Refills: 0 | Status: SHIPPED | OUTPATIENT
Start: 2022-12-23 | End: 2022-12-28

## 2022-12-23 RX ORDER — LEVOFLOXACIN 500 MG/1
250 TABLET, FILM COATED ORAL DAILY
Qty: 2 TABLET | Refills: 0 | Status: SHIPPED | OUTPATIENT
Start: 2022-12-23 | End: 2022-12-28

## 2022-12-23 NOTE — PROGRESS NOTES
TCM chart review. No TCM as patient was discharged to [facility].  Encounter closing.

## 2022-12-23 NOTE — PLAN OF CARE
Assumed care of patient at [address]. Patient is A&Ox4. Calm and cooperative. VSS. Room air. Afebrile. No tele. Regular diet. No nausea/vomiting/diarrhea. Patient denies pain/discomfort at this time. Continent. Up standby assist.  Medications were given per MAR. Left UA IV - saline locked. All needs met at this time.     Problem: CONFUSION  Goal: Confusion, delirium, dementia or psychosis is improved or at baseline  Description: INTERVENTIONS:  - Assess for possible contributors to thought disturbance, including medications, impaired vision or hearing, underlying metabolic abnormalities, dehydration, psychiatric diagnoses, and notify attending MD/LIP  - [place] high risk fall precautions, as indicated  - Provide frequent short contacts to provide reality reorientation, refocusing and direction  - Decrease environmental stimuli, including noise as appropriate  - Monitor and intervene to maintain adequate nutrition, hydration, elimination, sleep and activity  - Consider the need for a sitter if unable to leave patient unattended  - [address] consult as indicated  - Consult Pharmacy as needed  Outcome: Progressing     Problem: METABOLIC/FLUID AND ELECTROLYTES - ADULT  Goal: Electrolytes maintained within normal limits  Description: INTERVENTIONS:  - Monitor labs and rhythm and assess patient for signs and symptoms of electrolyte imbalances  - Administer electrolyte replacement as ordered  - Monitor response to electrolyte replacements, including rhythm and repeat lab results as appropriate  - Fluid restriction as ordered  - Instruct patient on fluid and nutrition restrictions as appropriate  Outcome: Progressing     Problem: NEUROLOGICAL - ADULT  Goal: Achieves stable or improved neurological status  Description: INTERVENTIONS  - Assess for and report changes in neurological status  - Initiate measures to prevent increased intracranial pressure  - Maintain blood pressure and fluid volume within ordered parameters to optimize cerebral perfusion and minimize risk of hemorrhage  - Monitor temperature, glucose, and sodium.  Initiate appropriate interventions as ordered  Outcome: Progressing     Problem: Impaired Cognition  Goal: Patient will exhibit improved attention, thought processing and/or memory  Description: Interventions:    Outcome: Progressing

## 2022-12-23 NOTE — PROGRESS NOTES
This writer called and endorsed report to [address] RN on jacinto unit. Also endorsed report to Sebastian Lopez RN about plan of care and notified about the transfer to [facility] room 906A. All needs met at this time.

## 2022-12-23 NOTE — PROGRESS NOTES
Dr. Ysabel Arevalo accepted pt to [facility] DIVISION unit at [facility] room 906A. Writer gave SBAR to JACKELIN on [facility] DIVISION unit. Writer called and spoke to IRWIN Patterson on [facility] and provided her with ext for RN to RN report. Once RN to RN report is given okay to set up transportation to [facility].

## 2022-12-24 PROBLEM — F32.3 CURRENT SEVERE EPISODE OF MAJOR DEPRESSIVE DISORDER WITH PSYCHOTIC FEATURES WITHOUT PRIOR EPISODE (HCC): Status: ACTIVE | Noted: 2022-12-23

## 2022-12-24 PROBLEM — E87.1 HYPONATREMIA: Status: RESOLVED | Noted: 2022-12-20 | Resolved: 2022-12-24

## 2023-01-04 ENCOUNTER — PATIENT MESSAGE (OUTPATIENT)
Dept: FAMILY MEDICINE CLINIC | Facility: CLINIC | Age: 73
End: 2023-01-04

## 2023-01-04 DIAGNOSIS — N30.01 ACUTE CYSTITIS WITH HEMATURIA: Primary | ICD-10-CM

## 2023-01-05 NOTE — TELEPHONE ENCOUNTER
From: Ayah Perez  To: Clay Weinstein DO  Sent: 1/4/2023 11:26 AM CST  Subject: Follow up after 12/20    I spent a week at [facility] and have been home for a week. I still had a UTI and was on Cipro until last Thursday. I am feeling fine. Do I need another urinalysis? ?

## 2023-01-09 ENCOUNTER — NURSE ONLY (OUTPATIENT)
Dept: FAMILY MEDICINE CLINIC | Facility: CLINIC | Age: 73
End: 2023-01-09
Payer: MEDICARE

## 2023-01-09 DIAGNOSIS — N30.01 ACUTE CYSTITIS WITH HEMATURIA: ICD-10-CM

## 2023-01-09 PROCEDURE — 87086 URINE CULTURE/COLONY COUNT: CPT | Performed by: FAMILY MEDICINE

## 2023-01-13 ENCOUNTER — TELEPHONE (OUTPATIENT)
Dept: FAMILY MEDICINE CLINIC | Facility: CLINIC | Age: 73
End: 2023-01-13

## 2023-01-13 NOTE — TELEPHONE ENCOUNTER
Patient calling was in [place] and they discontinued her   Blood pressure/water pill   Wants to know if she should be taking this     Also was given 2 rx's for depression would like to have more information regarding these medications

## 2023-01-13 NOTE — TELEPHONE ENCOUNTER
Patient should stop taking the hydrochlorothiazide as instructed at the time of discharge, [facility]. Patient has an appointment to see me in February, if there is a sooner appointment available recommend patient follow-up sooner in the office so we can monitor her blood pressure. If patient has a blood pressure monitor at home, recommend patient check blood pressure once or twice a day and keep a written record and bring with her to her next appointment.

## 2023-01-16 ENCOUNTER — PATIENT MESSAGE (OUTPATIENT)
Dept: FAMILY MEDICINE CLINIC | Facility: CLINIC | Age: 73
End: 2023-01-16

## 2023-01-16 RX ORDER — OLANZAPINE 2.5 MG/1
2.5 TABLET ORAL NIGHTLY
Qty: 30 TABLET | Refills: 0 | OUTPATIENT
Start: 2023-01-16

## 2023-01-16 RX ORDER — BUPROPION HYDROCHLORIDE 100 MG/1
100 TABLET ORAL
Qty: 30 TABLET | Refills: 0 | OUTPATIENT
Start: 2023-01-16

## 2023-01-17 ENCOUNTER — TELEPHONE (OUTPATIENT)
Dept: SURGERY | Facility: CLINIC | Age: 73
End: 2023-01-17

## 2023-01-17 ENCOUNTER — TELEPHONE (OUTPATIENT)
Dept: FAMILY MEDICINE CLINIC | Facility: CLINIC | Age: 73
End: 2023-01-17

## 2023-01-17 NOTE — TELEPHONE ENCOUNTER
Patient calling to request refill of buPROPion 100 MG Oral Tab and   OLANZapine 2.5 MG Oral Tab      [place] [address], [address] [facility] [phone], [phone]    Patient informed of 48 hour refill policy excluding weekends and holidays. Informed patient prescription is sent directly to pharmacy. Further explained patient will not receive a call back once prescription is ready.

## 2023-01-17 NOTE — TELEPHONE ENCOUNTER
Spoke to patient. She states she was able to get an appointment with Dr. Sarah Flowers for medication.

## 2023-01-17 NOTE — TELEPHONE ENCOUNTER
From: Leslye Wilson  To: Raquel Hill DO  Sent: 1/16/2023 10:25 AM CST  Subject: Medications    MyChart message said my refill request was denied and that I should contact my current provider. Can you help me get my 2 prescriptions refilled. Wellbutrin and Olanzapine. Thank you.

## 2023-01-17 NOTE — TELEPHONE ENCOUNTER
Patient calling is needing refills on some medications is requesting a recommendation for a psychiatrist  Dr Christian Donnelly said she can not prescribe the medications

## 2023-01-17 NOTE — TELEPHONE ENCOUNTER
RN called patient to discuss refill request for Olazapine and Wellbutrin. She has NOT yet had an appt with Dr. Bridger Lott. Pt is scheduled to see Dr. Bridger Lott for memory loss and hallucinations on 2/22/2023    Pt reports PCP will not refill and she does not have a follow up appt with any psychiatrist.  Pt reports she has reached out to [facility] and Dr. Caitlin Alvarado and per pt, he will not refill. RN advised pt that her request can be sent on to provider, but noted that these medications should be filled by PCP or psychiatry as they are not prescribed for neurological purposes and pt has not yet be evaluated. Pt VU, but reports she is unsure as to how to get medications filled. RN did advised pt to reach back out to PCP and discuss above.  VU.

## 2023-01-17 NOTE — TELEPHONE ENCOUNTER
I defer to PCP or psychiatry as I have not seen the patient, and it is possible that she currently may only need lower doses of these medications, I have no idea.

## 2023-01-19 NOTE — TELEPHONE ENCOUNTER
Phoned patient. States she has appointment with psych today and will address medications at the visit.

## 2023-02-23 ENCOUNTER — OFFICE VISIT (OUTPATIENT)
Dept: FAMILY MEDICINE CLINIC | Facility: CLINIC | Age: 73
End: 2023-02-23
Payer: MEDICARE

## 2023-02-23 VITALS
BODY MASS INDEX: 21.94 KG/M2 | TEMPERATURE: 97 F | DIASTOLIC BLOOD PRESSURE: 70 MMHG | HEIGHT: 61 IN | RESPIRATION RATE: 20 BRPM | OXYGEN SATURATION: 99 % | SYSTOLIC BLOOD PRESSURE: 120 MMHG | WEIGHT: 116.19 LBS | HEART RATE: 77 BPM

## 2023-02-23 DIAGNOSIS — Z12.31 ENCOUNTER FOR SCREENING MAMMOGRAM FOR MALIGNANT NEOPLASM OF BREAST: ICD-10-CM

## 2023-02-23 DIAGNOSIS — H61.21 IMPACTED CERUMEN OF RIGHT EAR: ICD-10-CM

## 2023-02-23 DIAGNOSIS — Z00.00 MEDICARE ANNUAL WELLNESS VISIT, SUBSEQUENT: Primary | ICD-10-CM

## 2023-02-23 PROCEDURE — 1126F AMNT PAIN NOTED NONE PRSNT: CPT | Performed by: FAMILY MEDICINE

## 2023-02-23 PROCEDURE — G0439 PPPS, SUBSEQ VISIT: HCPCS | Performed by: FAMILY MEDICINE

## 2023-02-23 NOTE — PATIENT INSTRUCTIONS
-Recommend use over-the-counter mineral oil, 3 to 5 drops nightly prior to your appointment for the earwax removal.          Andrea Crystal's SCREENING SCHEDULE   Tests on this list are recommended by your physician but may not be covered, or covered at this frequency, by your insurer. Please check with your insurance carrier before scheduling to verify coverage.    PREVENTATIVE SERVICES FREQUENCY &  COVERAGE DETAILS LAST COMPLETION DATE   Diabetes Screening    Fasting Blood Sugar /  Glucose    One screening every 12 months if never tested or if previously tested but not diagnosed with pre-diabetes   One screening every 6 months if diagnosed with pre-diabetes Lab Results   Component Value Date    GLU 98 12/24/2022        Cardiovascular Disease Screening    Lipid Panel  Cholesterol  Lipoprotein (HDL)  Triglycerides Covered every 5 years for all Medicare beneficiaries without apparent signs or symptoms of cardiovascular disease Lab Results   Component Value Date    CHOLEST 182 12/24/2022    HDL 47 12/24/2022     (H) 12/24/2022    TRIG 86 12/24/2022         Electrocardiogram (EKG)   Covered if needed at Welcome to Medicare, and non-screening if indicated for medical reasons 12/24/2022      Ultrasound Screening for Abdominal Aortic Aneurysm (AAA) Covered once in a lifetime for one of the following risk factors    Men who are 73-68 years old and have ever smoked    Anyone with a family history -     Colorectal Cancer Screening  Covered for ages 52-80; only need ONE of the following:    Colonoscopy   Covered every 10 years    Covered every 2 years if patient is at high risk or previous colonoscopy was abnormal -    Colorectal Cancer Screening due on 03/04/2023    Flexible Sigmoidoscopy   Covered every 4 years -    Fecal Occult Blood Test Covered annually -   Bone Density Screening    Bone density screening    Covered every 2 years after age 72 if diagnosed with risk of osteoporosis or estrogen deficiency. Covered yearly for long-term glucocorticoid medication use (Steroids) Last Dexa Scan:    XR DEXA BONE DENSITOMETRY (CPT=77080) 02/12/2022      No recommendations at this time   Pap and Pelvic    Pap   Covered every 2 years for women at normal risk;  Annually if at high risk -  No recommendations at this time    Chlamydia Annually if high risk -  No recommendations at this time   Screening Mammogram    Mammogram     Recommend annually for all female patients aged 36 and older    One baseline mammogram covered for patients aged 32-38 02/12/2022    Mammogram due on 02/12/2023    Immunizations    Influenza Covered once per flu season  Please get every year 10/19/2022  No recommendations at this time    Pneumococcal Each vaccine (Jmvgtlh51 & Riokxhzfv66) covered once after 65 Prevnar 13: 11/15/2019    Iyokhoofo17: 11/18/2020     No recommendations at this time    Hepatitis B One screening covered for patients with certain risk factors   -  No recommendations at this time    Tetanus Toxoid Not covered by Medicare Part B unless medically necessary (cut with metal); may be covered with your pharmacy prescription benefits -    Tetanus, Diptheria and Pertusis TD and TDaP Not covered by Medicare Part B -  No recommendations at this time    Zoster Not covered by Medicare Part B; may be covered with your pharmacy  prescription benefits 08/15/2014  No recommendations at this time

## 2023-03-02 ENCOUNTER — HOSPITAL ENCOUNTER (OUTPATIENT)
Dept: MAMMOGRAPHY | Age: 73
Discharge: HOME | End: 2023-03-02
Attending: FAMILY MEDICINE
Payer: MEDICARE

## 2023-03-02 DIAGNOSIS — Z12.31 ENCOUNTER FOR SCREENING MAMMOGRAM FOR MALIGNANT NEOPLASM OF BREAST: ICD-10-CM

## 2023-03-02 PROCEDURE — 77067 SCR MAMMO BI INCL CAD: CPT | Performed by: FAMILY MEDICINE

## 2023-03-02 PROCEDURE — 77063 BREAST TOMOSYNTHESIS BI: CPT | Performed by: FAMILY MEDICINE

## 2023-03-20 ENCOUNTER — HOSPITAL ENCOUNTER (OUTPATIENT)
Dept: MAMMOGRAPHY | Age: 73
Discharge: HOME | End: 2023-03-20
Attending: FAMILY MEDICINE
Payer: MEDICARE

## 2023-03-20 DIAGNOSIS — R92.2 INCONCLUSIVE MAMMOGRAM: ICD-10-CM

## 2023-03-20 PROCEDURE — 77061 BREAST TOMOSYNTHESIS UNI: CPT | Performed by: FAMILY MEDICINE

## 2023-03-20 PROCEDURE — 77065 DX MAMMO INCL CAD UNI: CPT | Performed by: FAMILY MEDICINE

## 2023-03-30 ENCOUNTER — OFFICE VISIT (OUTPATIENT)
Dept: FAMILY MEDICINE CLINIC | Facility: CLINIC | Age: 73
End: 2023-03-30
Payer: MEDICARE

## 2023-03-30 VITALS
HEART RATE: 84 BPM | DIASTOLIC BLOOD PRESSURE: 68 MMHG | SYSTOLIC BLOOD PRESSURE: 134 MMHG | BODY MASS INDEX: 21.99 KG/M2 | TEMPERATURE: 98 F | OXYGEN SATURATION: 98 % | WEIGHT: 115 LBS | HEIGHT: 60.63 IN

## 2023-03-30 DIAGNOSIS — H61.21 IMPACTED CERUMEN OF RIGHT EAR: Primary | ICD-10-CM

## 2023-03-30 DIAGNOSIS — Z12.11 ENCOUNTER FOR SCREENING FOR MALIGNANT NEOPLASM OF COLON: ICD-10-CM

## 2023-03-30 PROCEDURE — 69209 REMOVE IMPACTED EAR WAX UNI: CPT | Performed by: FAMILY MEDICINE

## 2023-04-04 PROBLEM — G93.40 ACUTE ENCEPHALOPATHY: Status: RESOLVED | Noted: 2022-12-20 | Resolved: 2023-04-04

## 2023-04-04 PROBLEM — N39.0 ACUTE UTI: Status: RESOLVED | Noted: 2022-12-20 | Resolved: 2023-04-04

## 2023-10-03 ENCOUNTER — TELEPHONE (OUTPATIENT)
Facility: CLINIC | Age: 73
End: 2023-10-03

## 2023-10-03 DIAGNOSIS — Z96.651 STATUS POST RIGHT KNEE REPLACEMENT: Primary | ICD-10-CM

## 2023-10-03 NOTE — TELEPHONE ENCOUNTER
Xr's ordered for upcoming appointment    Please call patient to come in 15-20 minutes earlier to complete images

## 2023-10-04 ENCOUNTER — HOSPITAL ENCOUNTER (OUTPATIENT)
Dept: GENERAL RADIOLOGY | Age: 73
Discharge: HOME | End: 2023-10-04
Attending: ORTHOPAEDIC SURGERY
Payer: MEDICARE

## 2023-10-04 ENCOUNTER — OFFICE VISIT (OUTPATIENT)
Dept: ORTHOPEDICS CLINIC | Facility: CLINIC | Age: 73
End: 2023-10-04
Payer: MEDICARE

## 2023-10-04 DIAGNOSIS — Z96.651 STATUS POST RIGHT KNEE REPLACEMENT: Primary | ICD-10-CM

## 2023-10-04 DIAGNOSIS — Z96.651 STATUS POST RIGHT KNEE REPLACEMENT: ICD-10-CM

## 2023-10-04 PROCEDURE — 99213 OFFICE O/P EST LOW 20 MIN: CPT | Performed by: ORTHOPAEDIC SURGERY

## 2023-10-04 PROCEDURE — 73562 X-RAY EXAM OF KNEE 3: CPT | Performed by: ORTHOPAEDIC SURGERY

## 2023-10-04 NOTE — PROGRESS NOTES
[facility] Progress Note    Subjective: Patient returns to clinic 1 year postop from right knee replacement. She reports that she had a setback during the postoperative recovery, was hospitalized for around a month, states she had a \"breakdown\". She reports that during that time she was not doing therapy or moving/working the knee. Per review of the notes, she was admitted for acute psychosis, discharged to inpatient unit/[facility]. She feels that the knee was doing very well prior to that, however after that episode she had difficulty with motion and some pain with the knee. She reports difficulty getting the knee fully straight and locking it out in extension like she does with the left. She also reports some pain around the knee when she rides a bike for the first few seconds to a minute, but subsequently lengths up and she is able to continue riding for about 30 minutes without difficulty. She is also bothered by numbness on the outside of the knee. Objective: Patient is slightly tearful. She demonstrates extension within a few degrees of full, flexion to 110. Knee is appropriately stable to varus and valgus stress throughout range of motion. Imaging: X-rays of the left knee personally viewed, independently interpreted and radiology report read. Cemented left total knee arthroplasty unchanged from films 1 year ago. No lucencies about bone cement implant interface. Symmetric joint space medial and lateral on the AP. Tibia and 5 degrees posterior slope. Patella is tracking centrally. Assessment/Plan: 26-year-old female 1 year postop status post right knee replacement. 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 Also discussed outcomes, from a range of motion standpoint she is doing average, and she is functioning without limitation due to range of motion deficit.   She may have had some setback from acute mental health issue requiring hospitalization, however encouraged her to continue working on stretching and range of motion as tolerated. May be continued functional improvement even up to 1 to 2 years after surgery. Would have her follow-up in 5 to 10 years for repeat x-rays with contacting us sooner if any questions or concerns.     Casey Hood MD, [address] Orthopedic Surgery  Phone [phone]  Fax [phone]

## 2024-04-18 ENCOUNTER — OFFICE VISIT (OUTPATIENT)
Dept: FAMILY MEDICINE CLINIC | Facility: CLINIC | Age: 74
End: 2024-04-18
Payer: MEDICARE

## 2024-04-18 VITALS
SYSTOLIC BLOOD PRESSURE: 134 MMHG | BODY MASS INDEX: 22.76 KG/M2 | DIASTOLIC BLOOD PRESSURE: 72 MMHG | OXYGEN SATURATION: 98 % | TEMPERATURE: 97 F | RESPIRATION RATE: 24 BRPM | HEIGHT: 60.63 IN | WEIGHT: 119 LBS

## 2024-04-18 DIAGNOSIS — Z00.00 MEDICARE ANNUAL WELLNESS VISIT, SUBSEQUENT: Primary | ICD-10-CM

## 2024-04-18 DIAGNOSIS — Z53.20 COLONOSCOPY REFUSED: ICD-10-CM

## 2024-04-18 DIAGNOSIS — Z82.49 FAMILY HISTORY OF PREMATURE CAD: ICD-10-CM

## 2024-04-18 DIAGNOSIS — M81.0 AGE-RELATED OSTEOPOROSIS WITHOUT CURRENT PATHOLOGICAL FRACTURE: ICD-10-CM

## 2024-04-18 DIAGNOSIS — Z78.0 POSTMENOPAUSAL: ICD-10-CM

## 2024-04-18 DIAGNOSIS — Z12.31 ENCOUNTER FOR SCREENING MAMMOGRAM FOR MALIGNANT NEOPLASM OF BREAST: ICD-10-CM

## 2024-04-18 DIAGNOSIS — E78.00 HYPERCHOLESTEROLEMIA: ICD-10-CM

## 2024-04-18 PROBLEM — F32.3 CURRENT SEVERE EPISODE OF MAJOR DEPRESSIVE DISORDER WITH PSYCHOTIC FEATURES WITHOUT PRIOR EPISODE (HCC): Status: RESOLVED | Noted: 2022-12-23 | Resolved: 2024-04-18

## 2024-04-18 PROBLEM — H61.21 IMPACTED CERUMEN OF RIGHT EAR: Status: RESOLVED | Noted: 2023-02-23 | Resolved: 2024-04-18

## 2024-04-18 PROBLEM — I10 ESSENTIAL HYPERTENSION: Status: RESOLVED | Noted: 2019-12-28 | Resolved: 2024-04-18

## 2024-04-18 PROBLEM — Z86.16 HISTORY OF 2019 NOVEL CORONAVIRUS DISEASE (COVID-19): Status: RESOLVED | Noted: 2022-09-15 | Resolved: 2024-04-18

## 2024-04-18 PROBLEM — Z79.899 ENCOUNTER FOR LONG-TERM CURRENT USE OF MEDICATION: Status: RESOLVED | Noted: 2020-11-18 | Resolved: 2024-04-18

## 2024-04-18 NOTE — PROGRESS NOTES
Subjective:   Mine Crystal is a 74 year old female who presents for a Medicare Subsequent Annual Wellness visit (Pt already had Initial Annual Wellness) and  osteoporosis and mild hypercholesterolemia .     History/Other:   Fall Risk Assessment:   She has been screened for Falls and is low risk.      Cognitive Assessment:   She had a completely normal cognitive assessment - see flowsheet entries       Functional Ability/Status:   Mine Crystal has a completely normal functional assessment. See flowsheet for details.      Depression Screening (PHQ-2/PHQ-9): PHQ-2 SCORE: 0  , done 4/18/2024   Trouble falling or staying asleep, or sleeping too much: 1     Feeling bad about yourself - or that you are a failure or have let yourself or your family down: 1    If you checked off any problems, how difficult have these problems made it for you to do your work, take care of things at home, or get along with other people?: Not difficult at all           Advanced Directives:   She does NOT have a Living Will. [ ]  She does NOT have a Power of  for Health Care. [ ]  Discussed Advance Care Planning with patient (and family/surrogate if present). Standard forms made available to patient in After Visit Summary.      Patient Active Problem List   Diagnosis    Hypercholesterolemia    Age-related osteoporosis without current pathological fracture    Family history of premature CAD    Colonoscopy refused    Primary osteoarthritis of right knee     Allergies:  She is allergic to penicillins.    Current Medications:  No outpatient medications have been marked as taking for the 4/18/24 encounter (Office Visit) with Belen Hidalgo DO.       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  Surgical History:  She  has a past surgical history that includes drain pilonidal cyst simpl (1968); knee replacement surgery (2015); Total abdominal hysterectomy (1994); oophorectomy; total knee replacement; hysterectomy; and correct bunion,othr methods.   Family History:  Her family history includes Cancer in her maternal grandmother; Diabetes in her father; Heart Attack in her mother and paternal grandfather; Heart Disorder in her mother; Stroke in her maternal grandfather.  Social History:  She  reports that she has never smoked. She has never used smokeless tobacco. She reports current alcohol use. She reports that she does not use drugs.    Tobacco:  She has never smoked tobacco.    CAGE Alcohol Screen:   CAGE screening score of 0 on 4/18/2024, showing low risk of alcohol abuse.      Patient Care Team:  Belen Hidalgo DO as PCP - General (Family Practice)  Johanne Feliciano PT as Physical Therapist (Physical Therapy)    Review of Systems  GENERAL: feels well overall  SKIN: denies any unusual skin lesions  EYES: denies blurred vision or double vision  HEENT: denies nasal congestion, sinus pain or ST  LUNGS: denies shortness of breath with exertion  CARDIOVASCULAR: denies chest pain on exertion  GI: denies abdominal pain, denies heartburn  : denies dysuria, vaginal discharge or itching, no complaint of urinary incontinence   MUSCULOSKELETAL: denies back pain  NEURO: denies headaches  PSYCHE: denies depression or anxiety  HEMATOLOGIC: denies hx of anemia  ENDOCRINE:  denies thyroid history  ALL/ASTHMA: denies hx of allergy or asthma    Objective:   Physical Exam  General Appearance:  Pleasant elderly  female.  Alert, cooperative, no distress, appears stated age   Head:  Normocephalic, without obvious abnormality, atraumatic   Eyes:  conjunctiva/corneas clear, EOM's intact both eyes   Ears:  Hearing grossly normal   Nose: No nasal discharge       Neck: Supple, symmetrical, trachea midline, no adenopathy;  thyroid: not enlarged, symmetric, no tenderness/mass/nodules       Lungs:   Clear to auscultation bilaterally, respirations unlabored   Heart:  Regular rate and rhythm, S1 and S2 normal, no murmur           Extremities: Extremities normal, atraumatic, no cyanosis or edema       Skin: No visible rashes   Lymph nodes: No cervical or supraclavicular adenopathy   Neurologic: Alert and orient x 3       /72   Temp 97.3 °F (36.3 °C) (Temporal)   Resp 24   Ht 5' 0.63\" (1.54 m)   Wt 119 lb (54 kg)   SpO2 98%   BMI 22.76 kg/m²  Estimated body mass index is 22.76 kg/m² as calculated from the following:    Height as of this encounter: 5' 0.63\" (1.54 m).    Weight as of this encounter: 119 lb (54 kg).    Medicare Hearing Assessment:   Hearing Screening    Time taken: 4/18/2024  9:37 AM  Screening Method: Finger Rub  Finger Rub Result: Pass         DATA:        Cholesterol:     Lab Results   Component Value Date    CHOLEST 182 12/24/2022    CHOLEST 214 (H) 06/08/2022    CHOLEST 167 01/28/2022     Lab Results   Component Value Date    HDL 47 12/24/2022    HDL 70 (H) 06/08/2022    HDL 71 (H) 01/28/2022     Lab Results   Component Value Date    TRIG 86 12/24/2022    TRIG 91 06/08/2022    TRIG 85 01/28/2022     Lab Results   Component Value Date     (H) 12/24/2022     (H) 06/08/2022    LDL 80 01/28/2022     Lab Results   Component Value Date    AST 11 (L) 12/24/2022    AST 13 (L) 12/09/2022    AST 17 09/19/2022     Lab Results   Component Value Date    ALT 14  12/24/2022    ALT 23 12/09/2022    ALT 25 09/19/2022           Assessment & Plan:   Mine Crystal is a 74 year old female who presents for a Medicare Assessment.     Encounter Diagnoses   Name Primary?    Medicare annual wellness visit, subsequent Yes    Encounter for screening mammogram for malignant neoplasm of breast     Colonoscopy refused     Age-related osteoporosis without current pathological fracture     Postmenopausal     Hypercholesterolemia     Family history of premature CAD        1. Medicare annual wellness visit, subsequent  Patient provided info on prevention, healthcare power of , and living will.    2. Encounter for screening mammogram for malignant neoplasm of breast  Screening mammogram ordered and pending.  - [facility] RADHA 2D+3D SCREENING BILAT (CPT=77067/12914); Future    3. Colonoscopy refused  Patient refuses colonoscopy and also refuses Cologuard.    4. Age-related osteoporosis without current pathological fracture  5. Postmenopausal  Postmenopausal osteoporosis.  Recommend reevaluate DEXA.  I discussed with patient that depending on results we will likely have her schedule follow-up appointment to discuss the osteoporosis findings.    - XR DEXA BONE DENSITOMETRY (CPT=77080); Future  - Comp Metabolic Panel (14); Future    6. Hypercholesterolemia  7. Family history of premature CAD  Labs ordered and pending, recommendations pending results.    - CBC With Differential With Platelet; Future  - Comp Metabolic Panel (14); Future  - Lipid Panel; Future  - Assay, Thyroid Stim Hormone; Future  - Free T4, (Free Thyroxine); Future        Orders Placed This Encounter   Procedures    CBC With Differential With Platelet    Comp Metabolic Panel (14)    Lipid Panel    Assay, Thyroid Stim Hormone    Free T4, (Free Thyroxine)       Imaging & Consults:  [facility] RADHA 2D+3D SCREENING BILAT (CPT=77067/05027)  XR DEXA BONE DENSITOMETRY (CPT=77080)      1. Medicare annual wellness visit, subsequent  (Primary)  2. Encounter for screening mammogram for malignant neoplasm of breast  -     [facility] RADHA 2D+3D SCREENING BILAT (CPT=77067/30783); Future; Expected date: 04/18/2024  3. Colonoscopy refused  4. Age-related osteoporosis without current pathological fracture  Overview:  2/12/2022, 1/4/2020 DEXA  Orders:  -     XR DEXA BONE DENSITOMETRY (CPT=77080); Future; Expected date: 04/18/2024  -     Comp Metabolic Panel (14); Future; Expected date: 04/18/2024  5. Postmenopausal  -     XR DEXA BONE DENSITOMETRY (CPT=77080); Future; Expected date: 04/18/2024  6. Hypercholesterolemia  -     CBC With Differential With Platelet; Future; Expected date: 04/18/2024  -     Comp Metabolic Panel (14); Future; Expected date: 04/18/2024  -     Lipid Panel; Future; Expected date: 04/18/2024  -     Assay, Thyroid Stim Hormone; Future; Expected date: 04/18/2024  -     Free T4, (Free Thyroxine); Future; Expected date: 04/18/2024  7. Family history of premature CAD  Overview:  Mother had fatal MI age 60 yo.    Other orders  -     Cancel: COLOGUARD COLON CANCER SCREENING (EXTERNAL)    The patient indicates understanding of these issues and agrees to the plan.  Reinforced healthy diet, lifestyle, and exercise.      Return in about 1 year (around 4/18/2025) for Medicare annual wellness visit.  Sooner pending results of bone density test..     Belen Hidalgo DO, 4/18/2024     Supplementary Documentation:   General Health:  In the past six months, have you lost more than 10 pounds without trying?: 2 - No  Has your appetite been poor?: No  Type of Diet: Balanced  How does the patient maintain a good energy level?: Appropriate Exercise;Stretching  How would you describe your daily physical activity?: Moderate  How would you describe your current health state?: Good  How do you maintain positive mental well-being?: Visiting Family;Social Interaction;Puzzles;Games;Visiting Friends  On a scale of 0 to 10, with 0 being no pain and 10 being severe  pain, what is your pain level?: 0 - (None)  In the past six months, have you experienced urine leakage?: 0-No  At any time do you feel concerned for the safety/well-being of yourself and/or your children, in your home or elsewhere?: No  Have you had any immunizations at another office such as Influenza, Hepatitis B, Tetanus, or Pneumococcal?: Yes         Mine Crystal's SCREENING SCHEDULE   Tests on this list are recommended by your physician but may not be covered, or covered at this frequency, by your insurer.   Please check with your insurance carrier before scheduling to verify coverage.   PREVENTATIVE SERVICES FREQUENCY &  COVERAGE DETAILS LAST COMPLETION DATE   Diabetes Screening    Fasting Blood Sugar /  Glucose    One screening every 12 months if never tested or if previously tested but not diagnosed with pre-diabetes   One screening every 6 months if diagnosed with pre-diabetes Lab Results   Component Value Date    GLU 98 12/24/2022        Cardiovascular Disease Screening    Lipid Panel  Cholesterol  Lipoprotein (HDL)  Triglycerides Covered every 5 years for all Medicare beneficiaries without apparent signs or symptoms of cardiovascular disease Lab Results   Component Value Date    CHOLEST 182 12/24/2022    HDL 47 12/24/2022     (H) 12/24/2022    TRIG 86 12/24/2022         Electrocardiogram (EKG)   [facility], and non-screening if indicated for medical reasons 12/24/2022      Ultrasound Screening for Abdominal Aortic Aneurysm (AAA) Covered once in a lifetime for one of the following risk factors    Men who are 65-75 years old and have ever smoked    Anyone with a family history -     Colorectal Cancer Screening  Covered for ages 50-85; only need ONE of the following:    Colonoscopy   Covered every 10 years    Covered every 2 years if patient is at high risk or previous colonoscopy was abnormal -    Health Maintenance   Topic Date Due    Colorectal Cancer Screening   04/18/2025 (Originally 1/18/1950)       Flexible Sigmoidoscopy   Covered every 4 years -    Fecal Occult Blood Test Covered annually -   Bone Density Screening    Bone density screening    Covered every 2 years after age 65 if diagnosed with risk of osteoporosis or estrogen deficiency.    Covered yearly for long-term glucocorticoid medication use (Steroids) Last Dexa Scan:    XR DEXA BONE DENSITOMETRY (CPT=77080) 02/12/2022      No recommendations at this time   Pap and Pelvic    Pap   Covered every 2 years for women at normal risk; Annually if at high risk -  No recommendations at this time    Chlamydia Annually if high risk -  No recommendations at this time   Screening Mammogram    Mammogram     Recommend annually for all female patients aged 40 and older    One baseline mammogram covered for patients aged 35-39 03/20/2023    Health Maintenance   Topic Date Due    Mammogram  Discontinued       Immunizations    Influenza Covered once per flu season  Please get every year 10/19/2023  No recommendations at this time    Pneumococcal Each vaccine ([id number] & [id number]) covered once after 65 Prevnar 13: 11/15/2019    [id number]: 11/18/2020     No recommendations at this time    Hepatitis B One screening covered for patients with certain risk factors   -  No recommendations at this time    Tetanus Toxoid Not covered by Medicare Part B unless medically necessary (cut with metal); may be covered with your pharmacy prescription benefits -    Tetanus, Diptheria and Pertusis TD and TDaP Not covered by Medicare Part B -  No recommendations at this time    Zoster Not covered by Medicare Part B; may be covered with your pharmacy  prescription benefits 08/15/2014  No recommendations at this time

## 2024-04-18 NOTE — PATIENT INSTRUCTIONS
Mine Crystal's SCREENING SCHEDULE   Tests on this list are recommended by your physician but may not be covered, or covered at this frequency, by your insurer.   Please check with your insurance carrier before scheduling to verify coverage.   PREVENTATIVE SERVICES FREQUENCY &  COVERAGE DETAILS LAST COMPLETION DATE   Diabetes Screening    Fasting Blood Sugar /  Glucose    One screening every 12 months if never tested or if previously tested but not diagnosed with pre-diabetes   One screening every 6 months if diagnosed with pre-diabetes Lab Results   Component Value Date    GLU 98 12/24/2022        Cardiovascular Disease Screening    Lipid Panel  Cholesterol  Lipoprotein (HDL)  Triglycerides Covered every 5 years for all Medicare beneficiaries without apparent signs or symptoms of cardiovascular disease Lab Results   Component Value Date    CHOLEST 182 12/24/2022    HDL 47 12/24/2022     (H) 12/24/2022    TRIG 86 12/24/2022         Electrocardiogram (EKG)   Covered if needed at [facility] to Medicare, and non-screening if indicated for medical reasons 12/24/2022      Ultrasound Screening for Abdominal Aortic Aneurysm (AAA) Covered once in a lifetime for one of the following risk factors   • Men who are 65-75 years old and have ever smoked   • Anyone with a family history -     Colorectal Cancer Screening  Covered for ages 50-85; only need ONE of the following:    Colonoscopy   Covered every 10 years    Covered every 2 years if patient is at high risk or previous colonoscopy was abnormal -    Health Maintenance   Topic Date Due   • Colorectal Cancer Screening  Never done       Flexible Sigmoidoscopy   Covered every 4 years -    Fecal Occult Blood Test Covered annually -   Bone Density Screening    Bone density screening    Covered every 2 years after age 65 if diagnosed with risk of osteoporosis or estrogen deficiency.    Covered yearly for long-term glucocorticoid medication use (Steroids) Last Dexa  Scan:    XR DEXA BONE DENSITOMETRY (CPT=77080) 02/12/2022      No recommendations at this time   Pap and Pelvic    Pap   Covered every 2 years for women at normal risk; Annually if at high risk -  No recommendations at this time    Chlamydia Annually if high risk -  No recommendations at this time   Screening Mammogram    Mammogram     Recommend annually for all female patients aged 40 and older    One baseline mammogram covered for patients aged 35-39 03/20/2023    Health Maintenance   Topic Date Due   • Mammogram  Discontinued       Immunizations    Influenza Covered once per flu season  Please get every year 10/19/2023  No recommendations at this time    Pneumococcal Each vaccine ([id number] & [id number]) covered once after 65 Prevnar 13: 11/15/2019    [id number]: 11/18/2020     No recommendations at this time    Hepatitis B One screening covered for patients with certain risk factors   -  No recommendations at this time    Tetanus Toxoid Not covered by Medicare Part B unless medically necessary (cut with metal); may be covered with your pharmacy prescription benefits -    Tetanus, Diptheria and Pertusis TD and TDaP Not covered by Medicare Part B -  No recommendations at this time    Zoster Not covered by Medicare Part B; may be covered with your pharmacy  prescription benefits 08/15/2014  No recommendations at this time

## 2024-05-08 ENCOUNTER — HOSPITAL ENCOUNTER (OUTPATIENT)
Dept: MAMMOGRAPHY | Age: 74
Discharge: HOME | End: 2024-05-08
Attending: FAMILY MEDICINE
Payer: MEDICARE

## 2024-05-08 ENCOUNTER — HOSPITAL ENCOUNTER (OUTPATIENT)
Dept: BONE DENSITY | Age: 74
Discharge: HOME | End: 2024-05-08
Attending: FAMILY MEDICINE
Payer: MEDICARE

## 2024-05-08 DIAGNOSIS — M81.0 AGE-RELATED OSTEOPOROSIS WITHOUT CURRENT PATHOLOGICAL FRACTURE: ICD-10-CM

## 2024-05-08 DIAGNOSIS — Z12.31 ENCOUNTER FOR SCREENING MAMMOGRAM FOR MALIGNANT NEOPLASM OF BREAST: ICD-10-CM

## 2024-05-08 DIAGNOSIS — Z78.0 POSTMENOPAUSAL: ICD-10-CM

## 2024-05-08 PROCEDURE — 77063 BREAST TOMOSYNTHESIS BI: CPT | Performed by: FAMILY MEDICINE

## 2024-05-08 PROCEDURE — 77080 DXA BONE DENSITY AXIAL: CPT | Performed by: FAMILY MEDICINE

## 2024-05-08 PROCEDURE — 77067 SCR MAMMO BI INCL CAD: CPT | Performed by: FAMILY MEDICINE

## 2024-05-22 ENCOUNTER — LAB ENCOUNTER (OUTPATIENT)
Dept: LAB | Age: 74
End: 2024-05-22
Attending: FAMILY MEDICINE

## 2024-05-22 DIAGNOSIS — M81.0 AGE-RELATED OSTEOPOROSIS WITHOUT CURRENT PATHOLOGICAL FRACTURE: ICD-10-CM

## 2024-05-22 DIAGNOSIS — E78.00 HYPERCHOLESTEROLEMIA: ICD-10-CM

## 2024-05-22 LAB
ALBUMIN SERPL-MCNC: 3.8 G/DL (ref 3.4–5)
ALBUMIN/GLOB SERPL: 1.2 {RATIO} (ref 1–2)
ALP LIVER SERPL-CCNC: 78 U/L
ALT SERPL-CCNC: 23 U/L
ANION GAP SERPL CALC-SCNC: 5 MMOL/L (ref 0–18)
AST SERPL-CCNC: 20 U/L (ref 15–37)
BASOPHILS # BLD AUTO: 0.03 X10(3) UL (ref 0–0.2)
BASOPHILS NFR BLD AUTO: 0.7 %
BILIRUB SERPL-MCNC: 0.7 MG/DL (ref 0.1–2)
BUN BLD-MCNC: 12 MG/DL (ref 9–23)
CALCIUM BLD-MCNC: 9 MG/DL (ref 8.5–10.1)
CHLORIDE SERPL-SCNC: 107 MMOL/L (ref 98–112)
CHOLEST SERPL-MCNC: 253 MG/DL (ref ?–200)
CO2 SERPL-SCNC: 27 MMOL/L (ref 21–32)
CREAT BLD-MCNC: 0.73 MG/DL
EGFRCR SERPLBLD CKD-EPI 2021: 86 ML/MIN/1.73M2 (ref 60–?)
EOSINOPHIL # BLD AUTO: 0.08 X10(3) UL (ref 0–0.7)
EOSINOPHIL NFR BLD AUTO: 1.9 %
ERYTHROCYTE [DISTWIDTH] IN BLOOD BY AUTOMATED COUNT: 14 %
FASTING PATIENT LIPID ANSWER: YES
FASTING STATUS PATIENT QL REPORTED: YES
GLOBULIN PLAS-MCNC: 3.3 G/DL (ref 2.8–4.4)
GLUCOSE BLD-MCNC: 95 MG/DL (ref 70–99)
HCT VFR BLD AUTO: 42.3 %
HDLC SERPL-MCNC: 82 MG/DL (ref 40–59)
HGB BLD-MCNC: 14.3 G/DL
IMM GRANULOCYTES # BLD AUTO: 0.01 X10(3) UL (ref 0–1)
IMM GRANULOCYTES NFR BLD: 0.2 %
LDLC SERPL CALC-MCNC: 155 MG/DL (ref ?–100)
LYMPHOCYTES # BLD AUTO: 1.2 X10(3) UL (ref 1–4)
LYMPHOCYTES NFR BLD AUTO: 28.8 %
MCH RBC QN AUTO: 31 PG (ref 26–34)
MCHC RBC AUTO-ENTMCNC: 33.8 G/DL (ref 31–37)
MCV RBC AUTO: 91.6 FL
MONOCYTES # BLD AUTO: 0.31 X10(3) UL (ref 0.1–1)
MONOCYTES NFR BLD AUTO: 7.4 %
NEUTROPHILS # BLD AUTO: 2.54 X10 (3) UL (ref 1.5–7.7)
NEUTROPHILS # BLD AUTO: 2.54 X10(3) UL (ref 1.5–7.7)
NEUTROPHILS NFR BLD AUTO: 61 %
NONHDLC SERPL-MCNC: 171 MG/DL (ref ?–130)
OSMOLALITY SERPL CALC.SUM OF ELEC: 288 MOSM/KG (ref 275–295)
PLATELET # BLD AUTO: 246 10(3)UL (ref 150–450)
POTASSIUM SERPL-SCNC: 4 MMOL/L (ref 3.5–5.1)
PROT SERPL-MCNC: 7.1 G/DL (ref 6.4–8.2)
RBC # BLD AUTO: 4.62 X10(6)UL
SODIUM SERPL-SCNC: 139 MMOL/L (ref 136–145)
T4 FREE SERPL-MCNC: 1.1 NG/DL (ref 0.8–1.7)
TRIGL SERPL-MCNC: 96 MG/DL (ref 30–149)
TSI SER-ACNC: 1.08 MIU/ML (ref 0.36–3.74)
VLDLC SERPL CALC-MCNC: 18 MG/DL (ref 0–30)
WBC # BLD AUTO: 4.2 X10(3) UL (ref 4–11)

## 2024-05-22 PROCEDURE — 36415 COLL VENOUS BLD VENIPUNCTURE: CPT

## 2024-05-22 PROCEDURE — 80053 COMPREHEN METABOLIC PANEL: CPT

## 2024-05-22 PROCEDURE — 84443 ASSAY THYROID STIM HORMONE: CPT

## 2024-05-22 PROCEDURE — 84439 ASSAY OF FREE THYROXINE: CPT

## 2024-05-22 PROCEDURE — 80061 LIPID PANEL: CPT

## 2024-05-22 PROCEDURE — 85025 COMPLETE CBC W/AUTO DIFF WBC: CPT

## 2024-07-09 ENCOUNTER — TELEPHONE (OUTPATIENT)
Dept: FAMILY MEDICINE CLINIC | Facility: CLINIC | Age: 74
End: 2024-07-09

## 2024-07-09 NOTE — TELEPHONE ENCOUNTER
Jas Blount, please schedule an appointment to see me for follow-up on your bone density test results, they are consistent with osteoporosis which increases your risk of fracture such as if you were to fall.  Thank you,  Dr. Hidalgo    Spoke to patient and scheduled for 7/29

## 2024-07-29 ENCOUNTER — OFFICE VISIT (OUTPATIENT)
Dept: FAMILY MEDICINE CLINIC | Facility: CLINIC | Age: 74
End: 2024-07-29
Payer: MEDICARE

## 2024-07-29 VITALS
DIASTOLIC BLOOD PRESSURE: 62 MMHG | OXYGEN SATURATION: 96 % | RESPIRATION RATE: 20 BRPM | HEIGHT: 60.63 IN | TEMPERATURE: 97 F | BODY MASS INDEX: 22.95 KG/M2 | WEIGHT: 120 LBS | SYSTOLIC BLOOD PRESSURE: 132 MMHG

## 2024-07-29 DIAGNOSIS — M81.0 POSTMENOPAUSAL OSTEOPOROSIS: Primary | ICD-10-CM

## 2024-07-29 DIAGNOSIS — E78.00 HYPERCHOLESTEROLEMIA: ICD-10-CM

## 2024-07-29 DIAGNOSIS — Z82.49 FAMILY HISTORY OF PREMATURE CAD: ICD-10-CM

## 2024-07-29 DIAGNOSIS — Z76.89 ENCOUNTER FOR NEW MEDICATION PRESCRIPTION: ICD-10-CM

## 2024-07-29 PROCEDURE — 99214 OFFICE O/P EST MOD 30 MIN: CPT | Performed by: FAMILY MEDICINE

## 2024-07-29 PROCEDURE — G2211 COMPLEX E/M VISIT ADD ON: HCPCS | Performed by: FAMILY MEDICINE

## 2024-07-29 RX ORDER — ALENDRONATE SODIUM 70 MG/1
70 TABLET ORAL
Qty: 12 TABLET | Refills: 3 | Status: SHIPPED | OUTPATIENT
Start: 2024-07-29

## 2024-07-29 NOTE — PROGRESS NOTES
Mine Crystal is a 74 year old female.     Chief Complaint   Patient presents with    Test Results     Dexa scan    Hyperlipidemia    Osteoporosis         HPI:       Postmenopausal osteoporosis:  Patient reports that she exercises on a regular basis including weightbearing exercises such as walking.  She is taking over-the-counter calcium and vitamin D supplements.  She is agreeable to start a bisphosphonate, she did do a little bit of looking up on the treatment of postmenopausal osteoporosis prior to her appointment today.        Hypercholesterolemia/Family history of premature coronary artery disease:  LDL worsening.  Patient's mother had fatal MI at age 59 years old.  Pt requests 3 month trial of improving diet and while continuing with exercising on a regular basis.  It is of note that patient was on rosuvastatin 5 mg nightly in the past which she tolerated well and did greatly improve her LDL, however patient's fasting plasma glucose increased a little above normal and patient was concerned about this so she discontinued the rosuvastatin 5 mg nightly.        Wt Readings from Last 6 Encounters:   07/29/24 120 lb (54.4 kg)   04/18/24 119 lb (54 kg)   03/30/23 115 lb (52.2 kg)   02/23/23 116 lb 3.2 oz (52.7 kg)   12/20/22 105 lb 14.4 oz (48 kg)   12/20/22 108 lb (49 kg)      Body mass index is 22.95 kg/m².        Current Outpatient Medications   Medication Sig Dispense Refill    alendronate 70 MG Oral Tab Take 1 tablet (70 mg total) by mouth every 7 days. 12 tablet 3      Past Medical History:    Acute delirium    Acute encephalopathy    Age-related osteoporosis without current pathological fracture    1/4/2020 DEXA    Anxiety state    Colonoscopy refused    Current severe episode of major depressive disorder with psychotic features without prior episode (HCC)    This occurred postoperatively      Depression    Essential hypertension    12/13/2019 New Diagnosis    Family history of premature CAD    Mother had  fatal MI age 60 yo.    High blood pressure    History of 2019 novel coronavirus disease (COVID-19)    July 2022    Hypercholesterolemia    Lesion of left upper eyelid    Neoplasm of uncertain behavior of skin of face    Osteoarthritis    Osteopenia    Seborrheic keratosis      Past Surgical History:   Procedure Laterality Date    Correct bunion,othr methods      Drain pilonidal cyst simpl  1968    Hysterectomy      Knee replacement surgery  2015    [facility]    Oophorectomy      @ age 44    Total abdominal hysterectomy  1994    [facility]    Total knee replacement        Social History:    Social History     Socioeconomic History    Marital status: Single   Tobacco Use    Smoking status: Never    Smokeless tobacco: Never   Vaping Use    Vaping status: Never Used   Substance and Sexual Activity    Alcohol use: Yes     Comment: Occ    Drug use: No   Other Topics Concern    Blood Transfusions No    Caffeine Concern Yes     Comment: 2 cups coffee daily    Sleep Concern No    Weight Concern Yes    Special Diet No    Exercise Yes     Comment: Bike, weights and step climber at home everyday    Seat Belt Yes         Family History   Problem Relation Age of Onset    Diabetes Father     Heart Disorder Mother     Heart Attack Mother     Cancer Maternal Grandmother     Stroke Maternal Grandfather     Heart Attack Paternal Grandfather      REVIEW OF SYSTEMS:   GENERAL HEALTH: Overall feels well.   RESPIRATORY: Denies: GERMAN/MENDIOLA  CARDIOVASCULAR: Denies CP/palpitations  VASCULAR: Denies LE edema, denies claudication type symptoms  GI: Denies abdominal pain/nausea/vomiting/blood in stool/black stool/bloating/constipation/diarrhea  : denies urinary symptoms  NEURO: denies headaches/dizziness  PSYCH: denies depression and anxiety    Immunization History   Administered Date(s) Administered    Covid-19 Vaccine Moderna 100 mcg/0.5 ml 03/02/2021, 03/30/2021, 11/18/2021    Covid-19 Vaccine Moderna Bivalent 50mcg/0.5mL 12+  years 11/21/2022    FLU VAC High Dose 65 YRS & Older PRSV Free (21422) 10/19/2022    FLUAD High Dose 65 yr and older (84039) 10/16/2018    Fluvirin, 3 Years & >, Im 10/18/2014    HIGH DOSE FLU 65 YRS AND OLDER PRSV FREE SINGLE D (49325) FLU CLINIC 10/05/2016, 10/09/2017, 10/05/2019, 10/16/2020, 10/27/2021, 10/19/2023    Pneumococcal (Prevnar 13) 11/15/2019    Pneumovax 23 11/18/2020    TDAP 10/08/2015    Zoster Vaccine Live (Zostavax) 08/15/2014    Zoster Vaccine Recombinant Adjuvanted (Shingrix) 09/10/2020, 11/02/2020   Deferred Date(s) Deferred    FLU VAC High Dose 65 YRS & Older PRSV Free (48256) 10/05/2022       EXAM:   /62   Temp 97.3 °F (36.3 °C) (Temporal)   Resp 20   Ht 5' 0.63\" (1.54 m)   Wt 120 lb (54.4 kg)   SpO2 96%   BMI 22.95 kg/m²   GENERAL: NAD, pleasant well-appearing  female  SKIN: no visible rashes  HEAD: NCAT  LUNGS: CTA A/P no wheezes/ronchi/rales/crackles  CARDIO: RRR, +S1/S2, no mm  VASCULAR: No lower extremity edema  EXTREMITIES: no cyanosis or clubbing  NEURO: Alert and Oriented x3.  No gross motor or gross sensory abnormalities.  PSYCH: Affect normal.  Normal thought content.        DATA:    Interpretation   PROCEDURE:  XR DEXA BONE DENSITOMETRY (CPT=77080)     COMPARISON:  [facility], , XR DEXA BONE DENSITOMETRY (CPT=77080), 2/12/2022, 10:57 AM.     INDICATIONS:  Z78.0 Postmenopausal M81.0 Age-related osteoporosis without current pathological fracture     PATIENT STATED HISTORY: (As transcribed by Technologist)  Osteoporosis.           LUMBAR SPINE ANALYSIS RESULTS:      Bone mineral density (BMD) (g/cm2):  0.876    Lumbar T-Score:  -1.6      % young normals:  84      % age matched controls:  112      Change from prior spine examination:  2.3%               TOTAL HIP ANALYSIS RESULTS:        Bone mineral density (BMD) (g/cm2):  0.674      Total Hip T-Score:  -2.2      % young normals:  72      % age matched controls:  93      Change from prior hip examination:  -9.0%                FEMORAL NECK ANALYSIS RESULTS:        Bone mineral density (BMD) (g/cm2):  0.543      Femoral neck T-Score:  -2.8      % young normals:  64      % age matched controls:  88      Change from prior hip examination:  -1.0%         ADDITIONAL FINDINGS:  No significant additional findings.      =====  CONCLUSION:  Bone mineral density values for femoral neck are compatible with the diagnosis of osteoporosis by WHO definition (T score less than -2.5). If therapy is initiated, a follow-up study in 1 year may aid in evaluation of therapeutic efficacy.      Recommendation:  The National Osteoporosis Foundation (NOF) recommends pharmacological treatment for patients with a FRAX 10-year risk score of 3% or higher for a hip fracture or 20% or higher for a major osteoporotic fracture, to prevent osteoporosis   and reduce fracture risk.     The World Health Organization has defined the following categories based on bone density:  Normal bone:  T-score greater than or equal to -1  Osteopenia: T-score  less than -1 and greater  than -2.5  Osteoporosis:  T-score less than or equal -2.5    LOCATION:  [facility]    Dictated by (CST): Jennifer Stanley MD on 5/08/2024 at 2:55 PM       Finalized by (CST): Jennifer Stanley MD on 5/08/2024 at 2:55 PM        Component      Latest Ref Rng 9/27/2017 12/27/2019 1/13/2021 6/4/2021 1/28/2022   Cholesterol, Total      <200 mg/dL 231 (H)  284 (H)  269 (H)  164  167    Triglycerides      30 - 149 mg/dL 97  131  85  85  85    HDL Cholesterol      40 - 59 mg/dL 65  74 (H)  68 (H)  70 (H)  71 (H)    LDL Cholesterol Calc      <100 mg/dL 147 (H)  184 (H)  184 (H)  78  80    VLDL      0 - 30 mg/dL 19  26  17  13  13    Chol/HDL Ratio      <4.44  3.55        NON-HDL CHOLESTEROL      <130 mg/dL 166 (H)  210 (H)  201 (H)  94  96    Patient Fasting?  Yes  Yes  Yes     Patient Fasting for Lipid?     Yes      Component      Latest Ref Rng 6/8/2022 12/24/2022 5/22/2024   Cholesterol, Total      <200 mg/dL 214  (H)  182  253 (H)    Triglycerides      30 - 149 mg/dL 91  86  96    HDL Cholesterol      40 - 59 mg/dL 70 (H)  47  82 (H)    LDL Cholesterol Calc      <100 mg/dL 128 (H)  119 (H)  155 (H)    VLDL      0 - 30 mg/dL 16  15  18    Chol/HDL Ratio      <4.44       NON-HDL CHOLESTEROL      <130 mg/dL 144 (H)  135 (H)  171 (H)    Patient Fasting?      Patient Fasting for Lipid? Yes   Yes           ASSESSMENT AND PLAN:       Encounter Diagnoses   Name Primary?    Postmenopausal osteoporosis Yes    Encounter for new medication prescription     Hypercholesterolemia     Family history of premature CAD            1. Postmenopausal osteoporosis  Recommend start bisphosphonate, alendronate.  Patient counseled on calcium and vitamin D supplementation.  Recommend continue with exercising on a regular basis.  Patient to let me know if she does not tolerate the alendronate, would likely recommend Prolia as next agent.  Follow-up in April when also due for Medicare annual wellness visit, sooner if needed.    -Please call or send a [employer] message to let Dr. Hidalgo know how much calcium and vitamin D you are taking every day and ask that you are chart be updated to reflect this information in your medication list.    -You should be taking 600 mg of calcium twice a day with food unless it is a higher dose and is extended release.  -You should be taking at least 800 units of vitamin D every day.  -Recommend take the calcium and vitamin D supplements with food to help improve absorption.    -Continue with exercising on a regular basis.    - alendronate 70 MG Oral Tab; Take 1 tablet (70 mg total) by mouth every 7 days.  Dispense: 12 tablet; Refill: 3    2. Encounter for new medication prescription  Medication use, risks, benefits, side effects and precautions discussed, patient verbalizes understanding. Questions encouraged and answered to patient's satisfaction.    - alendronate 70 MG Oral Tab; Take 1 tablet (70 mg total) by mouth every  7 days.  Dispense: 12 tablet; Refill: 3    3. Hypercholesterolemia  4.  Family history of premature coronary artery disease  Patient's mother had fatal MI at age 59 years old.  Pt requests 3 month trial of improving diet and while continuing with exercising on a regular basis.  It is of note that patient was on rosuvastatin 5 mg nightly in the past which she tolerated well and did greatly improve her LDL, however patient's fasting plasma glucose increased a little above normal and patient was concerned about this so she discontinued the rosuvastatin 5 mg nightly.  Reevaluate lipid panel in 3 months, if not improving or if worsening we will have patient follow-up sooner.    - Lipid Panel; Future        Orders Placed This Encounter   Procedures    Lipid Panel       Meds & Refills for this Visit:  Requested Prescriptions     Signed Prescriptions Disp Refills    alendronate 70 MG Oral Tab 12 tablet 3     Sig: Take 1 tablet (70 mg total) by mouth every 7 days.         Return in about 8 months (around 4/4/2025) for Medicare annual wellness visit.  Sooner pending lipid panel results..

## 2024-07-29 NOTE — PATIENT INSTRUCTIONS
-Please call or send a [employer] message to let Dr. Hidalgo know how much calcium and vitamin D you are taking every day and ask that you are chart be updated to reflect this information in your medication list.    -You should be taking 600 mg of calcium twice a day with food unless it is a higher dose and is extended release.  -You should be taking at least 800 units of vitamin D every day.  -Recommend take the calcium and vitamin D supplements with food to help improve absorption.    -Continue with exercising on a regular basis.

## 2024-08-07 ENCOUNTER — TELEPHONE (OUTPATIENT)
Dept: FAMILY MEDICINE CLINIC | Facility: CLINIC | Age: 74
End: 2024-08-07

## 2024-08-07 RX ORDER — ACETAMINOPHEN 160 MG
2000 TABLET,DISINTEGRATING ORAL DAILY
COMMUNITY

## 2024-08-07 NOTE — TELEPHONE ENCOUNTER
Patient called and verified that her supplements are calcium 1000 mg daily and vit D 2000IU daily. Added to chart

## 2024-08-07 NOTE — TELEPHONE ENCOUNTER
Patient calling to let Dr. Belen Hidalgo know how much Calcium & Vit E she is taking to be updated in her chart:    Calcium - 1000mg once a day  Vit E - 2000mg once a day     Please advise.

## 2024-08-12 ENCOUNTER — TELEPHONE (OUTPATIENT)
Dept: FAMILY MEDICINE CLINIC | Facility: CLINIC | Age: 74
End: 2024-08-12

## 2024-08-12 NOTE — TELEPHONE ENCOUNTER
Patient calling with possible side effect to her new rx of alendronate 70 MG Oral Tab   She is having right arm pain and lack of movement in hand  She is very concerned     Please advise.

## 2024-08-12 NOTE — TELEPHONE ENCOUNTER
Spoke with patient and she did not have any injury. Gave her Dr Hidalgo's recommendations. Patient verbally understands.

## 2024-08-12 NOTE — TELEPHONE ENCOUNTER
Any recall of injury or repetitive use injury that may have precipitated the pain?  Recommend stop taking the alendronate for now and stay off of the alendronate for a month.  If after a month the pain has subsided recommend try taking the alendronate again and if she has any possible side effects again stop taking the alendronate and let me know so next step can be recommended.

## 2024-08-12 NOTE — TELEPHONE ENCOUNTER
Spoke with patient. Patient described that her arm pain woke her. She did say that she sleeps on her side. Patient did take Tylenol and the pain in her arm has subsided. She stated that she still has some pain at her wrist. She is concerned that her alendronate 70 mg tab may be causing her the pain. She last took her medication 8/11.   Please advise if you think she should taking her medication.

## 2024-08-30 ENCOUNTER — OFFICE VISIT (OUTPATIENT)
Dept: FAMILY MEDICINE CLINIC | Facility: CLINIC | Age: 74
End: 2024-08-30
Payer: MEDICARE

## 2024-08-30 ENCOUNTER — TELEPHONE (OUTPATIENT)
Dept: FAMILY MEDICINE CLINIC | Facility: CLINIC | Age: 74
End: 2024-08-30

## 2024-08-30 VITALS
TEMPERATURE: 98 F | HEIGHT: 61 IN | BODY MASS INDEX: 22.66 KG/M2 | HEART RATE: 86 BPM | DIASTOLIC BLOOD PRESSURE: 78 MMHG | OXYGEN SATURATION: 97 % | WEIGHT: 120 LBS | RESPIRATION RATE: 16 BRPM | SYSTOLIC BLOOD PRESSURE: 160 MMHG

## 2024-08-30 DIAGNOSIS — R05.9 COUGH, UNSPECIFIED TYPE: Primary | ICD-10-CM

## 2024-08-30 DIAGNOSIS — R03.0 ELEVATED BLOOD PRESSURE READING: ICD-10-CM

## 2024-08-30 PROCEDURE — 99213 OFFICE O/P EST LOW 20 MIN: CPT | Performed by: NURSE PRACTITIONER

## 2024-08-30 RX ORDER — ALBUTEROL SULFATE 90 UG/1
2 AEROSOL, METERED RESPIRATORY (INHALATION) EVERY 6 HOURS PRN
Qty: 18 G | Refills: 0 | Status: SHIPPED | OUTPATIENT
Start: 2024-08-30 | End: 2025-03-28

## 2024-08-30 RX ORDER — BENZONATATE 200 MG/1
200 CAPSULE ORAL 3 TIMES DAILY PRN
Qty: 20 CAPSULE | Refills: 0 | Status: SHIPPED | OUTPATIENT
Start: 2024-08-30 | End: 2024-09-06

## 2024-08-30 NOTE — PROGRESS NOTES
CHIEF COMPLAINT:     Chief Complaint   Patient presents with    Cough       HPI:   Mine Crystal is a 74 year old female who presents for post covid cough. Pt states she had covid with moderate symptoms including fever and cough for few days. Covid symptoms have resolved, pt if feeling much better, except cough at night when she lies down. Cough is interfering with sleeping, pt has coughing fits.     Current Outpatient Medications   Medication Sig Dispense Refill    albuterol (VENTOLIN HFA) 108 (90 Base) MCG/ACT Inhalation Aero Soln Inhale 2 puffs into the lungs every 6 (six) hours as needed. 18 g 0    benzonatate 200 MG Oral Cap Take 1 capsule (200 mg total) by mouth 3 (three) times daily as needed for cough. 20 capsule 0    cholecalciferol 50 MCG (2000 UT) Oral Cap Take 1 capsule (2,000 Units total) by mouth daily.      Calcium-Vitamin D-Vitamin K 600-1000-90 MG-UNT-MCG Oral Tab Take 1 tablet by mouth daily.      alendronate 70 MG Oral Tab Take 1 tablet (70 mg total) by mouth every 7 days. (Patient not taking: Reported on 8/30/2024) 12 tablet 3      Past Medical History:    Acute delirium    Acute encephalopathy    Age-related osteoporosis without current pathological fracture    1/4/2020 DEXA    Anxiety state    Colonoscopy refused    Current severe episode of major depressive disorder with psychotic features without prior episode (HCC)    This occurred postoperatively      Depression    Essential hypertension    12/13/2019 New Diagnosis    Family history of premature CAD    Mother had fatal MI age 58 yo.    High blood pressure    History of 2019 novel coronavirus disease (COVID-19)    July 2022    Hypercholesterolemia    Lesion of left upper eyelid    Neoplasm of uncertain behavior of skin of face    Osteoarthritis    Osteopenia    Seborrheic keratosis      Past Surgical History:   Procedure Laterality Date    Correct bunion,othr methods      Drain pilonidal cyst simpl  1968    Hysterectomy      Knee  replacement surgery  2015    [facility]    Oophorectomy      @ age 44    Total abdominal hysterectomy  1994    [facility]    Total knee replacement           Social History     Socioeconomic History    Marital status: Single   Tobacco Use    Smoking status: Never    Smokeless tobacco: Never   Vaping Use    Vaping status: Never Used   Substance and Sexual Activity    Alcohol use: Yes     Comment: Occ    Drug use: No   Other Topics Concern    Blood Transfusions No    Caffeine Concern Yes     Comment: 2 cups coffee daily    Sleep Concern No    Weight Concern Yes    Special Diet No    Exercise Yes     Comment: Bike, weights and step climber at home everyday    Seat Belt Yes         REVIEW OF SYSTEMS:   GENERAL: intact appetite  SKIN: no rashes or abnormal skin lesions  HEENT: See HPI  LUNGS: See HPI  CARDIOVASCULAR: denies chest pain or palpitations   GI: denies N/V/C or abdominal pain      EXAM:   /78   Pulse 86   Temp 98.3 °F (36.8 °C)   Resp 16   Ht 5' 1\" (1.549 m)   Wt 120 lb (54.4 kg)   SpO2 97%   BMI 22.67 kg/m²   GENERAL: well developed, well nourished,in no apparent distress  SKIN: no rashes,no suspicious lesions  HEAD: atraumatic, normocephalic.  no tenderness on palpation of sinuses  EYES: conjunctiva clear, EOM intact  EARS: TM's grey, no bulging, no retraction, no fluid, bony landmarks visible  NOSE: Nostrils patent, no nasal discharge, nasal mucosa pink, moist   THROAT: Oral mucosa pink, moist. Posterior pharynx is not erythematous. no exudates.     NECK: Supple, non-tender  LUNGS: clear to auscultation bilaterally, no wheezes or rhonchi. Breathing is non labored.  CARDIO: RRR without murmur  EXTREMITIES: no cyanosis, clubbing or edema  LYMPH:  no cervical lymphadenopathy.    PSYCH: pleasant mood and affect  NEURO: no focal deficits      ASSESSMENT AND PLAN:   Mine Crystal is a 74 year old female who presents with upper respiratory symptoms that are consistent  with    ASSESSMENT:   Encounter Diagnoses   Name Primary?    Cough, unspecified type Yes    Elevated blood pressure reading        PLAN:   Meds as below.    Comfort care as described in Patient Instructions      Meds & Refills for this Visit:  Requested Prescriptions     Signed Prescriptions Disp Refills    albuterol (VENTOLIN HFA) 108 (90 Base) MCG/ACT Inhalation Aero Soln 18 g 0     Sig: Inhale 2 puffs into the lungs every 6 (six) hours as needed.    benzonatate 200 MG Oral Cap 20 capsule 0     Sig: Take 1 capsule (200 mg total) by mouth 3 (three) times daily as needed for cough.     Risks, benefits, and side effects of medication explained and discussed.  The patient indicates understanding of these issues and agrees to the plan.  The patient is asked to f/u with PCP if sx's persist or worsen.  There are no Patient Instructions on file for this visit.

## 2024-08-30 NOTE — TELEPHONE ENCOUNTER
Patient called in and said that 2 weeks ago she had covid.  But the cough is lingering.  Barking type cough, chest.  Kept patient up last night.  Denies fever.  Has not tried anything OTC.  Went to a [facility] clinic but did not get any help.  Directed her to the [facility] on [id number]  Patient was out shopping at this time.

## 2024-09-30 ENCOUNTER — HOSPITAL ENCOUNTER (EMERGENCY)
Age: 74
Discharge: HOME | End: 2024-09-30
Attending: EMERGENCY MEDICINE
Payer: MEDICARE

## 2024-09-30 ENCOUNTER — APPOINTMENT (OUTPATIENT)
Dept: GENERAL RADIOLOGY | Age: 74
End: 2024-09-30
Attending: EMERGENCY MEDICINE
Payer: MEDICARE

## 2024-09-30 VITALS
HEART RATE: 69 BPM | SYSTOLIC BLOOD PRESSURE: 170 MMHG | RESPIRATION RATE: 17 BRPM | WEIGHT: 119 LBS | DIASTOLIC BLOOD PRESSURE: 72 MMHG | TEMPERATURE: 98 F | BODY MASS INDEX: 22.47 KG/M2 | HEIGHT: 61 IN | OXYGEN SATURATION: 98 %

## 2024-09-30 DIAGNOSIS — M54.50 ACUTE LEFT-SIDED LOW BACK PAIN WITHOUT SCIATICA: Primary | ICD-10-CM

## 2024-09-30 LAB
BILIRUB UR QL STRIP.AUTO: NEGATIVE
CLARITY UR REFRACT.AUTO: CLEAR
COLOR UR AUTO: YELLOW
GLUCOSE UR STRIP.AUTO-MCNC: NEGATIVE MG/DL
KETONES UR STRIP.AUTO-MCNC: NEGATIVE MG/DL
NITRITE UR QL STRIP.AUTO: NEGATIVE
PH UR STRIP.AUTO: 5.5 [PH] (ref 5–8)
PROT UR STRIP.AUTO-MCNC: NEGATIVE MG/DL
RBC UR QL AUTO: NEGATIVE
SP GR UR STRIP.AUTO: 1.01 (ref 1–1.03)
UROBILINOGEN UR STRIP.AUTO-MCNC: 0.2 MG/DL

## 2024-09-30 PROCEDURE — 99284 EMERGENCY DEPT VISIT MOD MDM: CPT

## 2024-09-30 PROCEDURE — 81001 URINALYSIS AUTO W/SCOPE: CPT | Performed by: EMERGENCY MEDICINE

## 2024-09-30 PROCEDURE — 87086 URINE CULTURE/COLONY COUNT: CPT | Performed by: EMERGENCY MEDICINE

## 2024-09-30 PROCEDURE — 72110 X-RAY EXAM L-2 SPINE 4/>VWS: CPT | Performed by: EMERGENCY MEDICINE

## 2024-09-30 PROCEDURE — 81015 MICROSCOPIC EXAM OF URINE: CPT | Performed by: EMERGENCY MEDICINE

## 2024-09-30 RX ORDER — CYCLOBENZAPRINE HCL 10 MG
10 TABLET ORAL 3 TIMES DAILY PRN
Qty: 20 TABLET | Refills: 0 | Status: SHIPPED | OUTPATIENT
Start: 2024-09-30 | End: 2024-10-07

## 2024-09-30 RX ORDER — TRAMADOL HYDROCHLORIDE 50 MG/1
TABLET ORAL EVERY 6 HOURS PRN
Qty: 10 TABLET | Refills: 0 | Status: SHIPPED | OUTPATIENT
Start: 2024-09-30 | End: 2024-10-05

## 2024-09-30 RX ORDER — PREDNISONE 20 MG/1
60 TABLET ORAL ONCE
Status: COMPLETED | OUTPATIENT
Start: 2024-09-30 | End: 2024-09-30

## 2024-09-30 RX ORDER — PREDNISONE 20 MG/1
40 TABLET ORAL DAILY
Qty: 10 TABLET | Refills: 0 | Status: SHIPPED | OUTPATIENT
Start: 2024-09-30 | End: 2024-10-05

## 2024-09-30 RX ORDER — CYCLOBENZAPRINE HCL 10 MG
10 TABLET ORAL ONCE
Status: COMPLETED | OUTPATIENT
Start: 2024-09-30 | End: 2024-09-30

## 2024-09-30 NOTE — ED INITIAL ASSESSMENT (HPI)
C/o low left back pain worse when laying or sitting  - states better when walking - denies injury - pain started Friday

## 2024-09-30 NOTE — DISCHARGE INSTRUCTIONS
Neurologic Instructions    Call your doctor if you have:  increased pain or headache.   trouble seeing, walking or using your arms or legs.   dizziness or passing out.   any change in behavior (agitated or sleepy).   trouble being awakened from sleep.   numbness in your face, arm or leg.   extreme weakness.   trouble talking.   nausea and vomiting.   any new or severe symptoms.    Take your medicines as prescribed. Most important, see a doctor again as discussed. If you have problems that we have not discussed, call or visit your doctor right away. If you cannot reach your doctor, return to the Emergency Department.     FLEXERIL MAY CAUSE DIZZINESS.

## 2024-09-30 NOTE — ED PROVIDER NOTES
Patient Seen in: [facility] In [place]      History     Chief Complaint   Patient presents with    Back Pain     Stated Complaint: back pain for 3-4 days    Subjective:   HPI    74-year-old female presents for evaluation of left-sided lower back pain which radiates up the back and is worse with certain movements over the past 3 to 4 days.  Preceding onset of symptoms, patient was hosting a dinner party.  Does not recall any significant trauma.  No radiation down the legs.  No numbness or tingling.  No bowel or bladder incontinence.  No urinary symptoms.  No relief with Tylenol, Motrin, ice and heat at home    Objective:   Past Medical History:    Acute delirium    Acute encephalopathy    Age-related osteoporosis without current pathological fracture    1/4/2020 DEXA    Anxiety state    Colonoscopy refused    Current severe episode of major depressive disorder with psychotic features without prior episode (HCC)    This occurred postoperatively      Depression    Essential hypertension    12/13/2019 New Diagnosis    Family history of premature CAD    Mother had fatal MI age 60 yo.    High blood pressure    History of 2019 novel coronavirus disease (COVID-19)    July 2022    Hypercholesterolemia    Lesion of left upper eyelid    Neoplasm of uncertain behavior of skin of face    Osteoarthritis    Osteopenia    Seborrheic keratosis              Past Surgical History:   Procedure Laterality Date    Correct bunion,othr methods      Drain pilonidal cyst simpl  1968    Hysterectomy      Knee replacement surgery  2015    [facility]    Oophorectomy      @ age 44    Total abdominal hysterectomy  1994    [facility]    Total knee replacement                  Social History     Socioeconomic History    Marital status: Single   Tobacco Use    Smoking status: Never    Smokeless tobacco: Never   Vaping Use    Vaping status: Never Used   Substance and Sexual Activity    Alcohol use: Yes     Comment:  Occ    Drug use: No   Other Topics Concern    Blood Transfusions No    Caffeine Concern Yes     Comment: 2 cups coffee daily    Sleep Concern No    Weight Concern Yes    Special Diet No    Exercise Yes     Comment: Bike, weights and step climber at home everyday    Seat Belt Yes              Review of Systems    Positive for stated Chief Complaint: Back Pain    Other systems are as noted in HPI.  Constitutional and vital signs reviewed.      All other systems reviewed and negative except as noted above.    Physical Exam     ED Triage Vitals [09/30/24 1008]   BP (!) 163/76   Pulse 88   Resp 15   Temp 98.9 °F (37.2 °C)   Temp src Temporal   SpO2 97 %   O2 Device None (Room air)       Current Vitals:   Vital Signs  BP: (!) 172/73  Pulse: 70  Resp: 16  Temp: 97.9 °F (36.6 °C)  Temp src: Oral    Oxygen Therapy  SpO2: 98 %  O2 Device: None (Room air)            Physical Exam    General: Alert, oriented, no apparent distress  HEENT:  Pupils equal reactive.  Extraocular motions intact. MMM.  Neck: Supple  Lungs: Clear to auscultation bilaterally.  Heart: Regular rate and rhythm.  Abdomen: Soft, nontender.   Skin: No rash.  No edema.  Neurologic: No focal neurologic deficits.  Normal speech pattern  Musculoskeletal: Left lumbar paraspinal muscular tenderness        ED Course     Labs Reviewed   URINALYSIS WITH CULTURE REFLEX - Abnormal; Notable for the following components:       Result Value    Leukocyte Esterase Urine Small (*)     All other components within normal limits   UA MICROSCOPIC ONLY, URINE - Abnormal; Notable for the following components:    WBC Urine 6-10 (*)     Squamous Epi. Cells Few (*)     All other components within normal limits   URINE CULTURE, ROUTINE                      MDM        Differential diagnosis includes sciatica, low back strain/muscle spasm, UTI      XR LUMBAR SPINE (MIN 4 VIEWS) (CPT=72110)    Result Date: 9/30/2024  PROCEDURE:  XR LUMBAR SPINE (MIN 4 VIEWS) (CPT=72110)  TECHNIQUE:  AP,  lateral, oblique, and coned down L5-S1 views were obtained.  COMPARISON:  None.  INDICATIONS:  back pain for 3-4 days  PATIENT STATED HISTORY: (As transcribed by Technologist)  Patient complains of back pain to the mid-left side that radiates up sometimes for a couple days.    FINDINGS:  There is lumbar lordosis and mild levocurvature.  There is grade 1 anterolisthesis of L4 on L5.  The vertebral body heights are maintained.  There is multilevel loss of intervertebral disc height, which is severe at L5-S1 and moderate to severe at L4-L5.              CONCLUSION:  Grade 1 anterolisthesis of L4 on L5.  Multilevel spondylosis as described.  Scoliosis.   LOCATION:  [facility]   Dictated by (CST): Ziggy Kidd MD on 9/30/2024 at 12:14 PM     Finalized by (CST): Ziggy Kidd MD on 9/30/2024 at 12:15 PM          UA is not consistent with UTI        Medications   lidocaine-menthol 4-1 % patch 1 patch (1 patch Transdermal Patch Applied 9/30/24 1147)   predniSONE (Deltasone) tab 60 mg (60 mg Oral Given 9/30/24 1144)   cyclobenzaprine (Flexeril) tab 10 mg (10 mg Oral Given 9/30/24 1143)     Plan for prednisone, Flexeril and Norco for breakthrough pain.  Patient to follow-up with spine/pain in 1 week if no better.  Return here if symptoms worsen or if new symptoms develop prior to follow-up                  Medical Decision Making      Disposition and Plan     Clinical Impression:  1. Acute left-sided low back pain without sciatica         Disposition:  Discharge  9/30/2024  1:33 pm    Follow-up:  [facility], [place] [address] [place] [address] [place] [id number] [phone]  Call  choose option 2 for neurosurgery or pain follow up          Medications Prescribed:  Current Discharge Medication List        START taking these medications    Details   predniSONE 20 MG Oral Tab Take 2 tablets (40 mg total) by mouth daily for 5 days.  Qty: 10 tablet, Refills: 0      cyclobenzaprine  10 MG Oral Tab Take 1 tablet (10 mg total) by mouth 3 (three) times daily as needed for Muscle spasms.  Qty: 20 tablet, Refills: 0      traMADol 50 MG Oral Tab Take 1-2 tablets ( mg total) by mouth every 6 (six) hours as needed for Pain.  Qty: 10 tablet, Refills: 0    Associated Diagnoses: Acute left-sided low back pain without sciatica

## 2024-10-03 ENCOUNTER — TELEPHONE (OUTPATIENT)
Dept: FAMILY MEDICINE CLINIC | Facility: CLINIC | Age: 74
End: 2024-10-03

## 2024-10-03 RX ORDER — SULFAMETHOXAZOLE/TRIMETHOPRIM 800-160 MG
1 TABLET ORAL EVERY 12 HOURS
Qty: 10 TABLET | Refills: 0 | Status: SHIPPED | OUTPATIENT
Start: 2024-10-03 | End: 2024-10-08

## 2024-10-03 NOTE — TELEPHONE ENCOUNTER
Patient calling regarding follow-up from [facility] ED visit on 9/30/24. Patient seen for possible UTI and would like to know her results. Patient having urinary frequency and discomfort.    Please review and advise.

## 2024-10-03 NOTE — TELEPHONE ENCOUNTER
Chart reviewed.  Urinalysis consistent with urinary tract infection/acute cystitis.  Urine culture is negative, it could be a false negative.    Recommend treat empirically for presumed urinary tract infection with generic Bactrim DS, 5-day treatment.  Please instruct patient to call if symptoms do not resolve or if they return.              Results for orders placed or performed during the hospital encounter of 09/30/24   Urinalysis with Culture Reflex    Collection Time: 09/30/24 11:47 AM    Specimen: Urine, clean catch   Result Value Ref Range    Urine Color Yellow Yellow    Clarity Urine Clear Clear    Spec Gravity 1.015 1.005 - 1.030    Glucose Urine Negative Negative mg/dL    Bilirubin Urine Negative Negative    Ketones Urine Negative Negative mg/dL    Blood Urine Negative Negative    pH Urine 5.5 5.0 - 8.0    Protein Urine Negative Negative mg/dL    Urobilinogen Urine 0.2 <2.0 mg/dL    Nitrite Urine Negative Negative    Leukocyte Esterase Urine Small (A) Negative   UA Microscopic only, urine    Collection Time: 09/30/24 11:47 AM   Result Value Ref Range    WBC Urine 6-10 (A) 0 - 5 /HPF    RBC Urine 0-2 0 - 2 /HPF    Bacteria Urine None Seen None Seen /HPF    Squamous Epi. Cells Few (A) None Seen /HPF    Renal Tubular Epithelial Cells None Seen None Seen /HPF    Transitional Cells None Seen None Seen /HPF    Yeast Urine None Seen None Seen /HPF   Urine Culture, Routine    Collection Time: 09/30/24 11:47 AM    Specimen: Urine, clean catch   Result Value Ref Range    Urine Culture No Growth at 18-24 hrs.

## 2024-10-03 NOTE — TELEPHONE ENCOUNTER
Spoke with patient. Patient was seen in ED 9/30 for back pain. Patient was prescribed medications for that and they are helping. However, the patient states that she has dry mouth so has been drinking more water, which she is experiencing frequent urination. She denies discomfort, fevers, or any other symptoms. Patient did give a UA/UC in ED. Patient concerned that she may have UTI due to urine frequency.    Please review and advise.

## 2024-10-29 ENCOUNTER — LAB ENCOUNTER (OUTPATIENT)
Dept: LAB | Age: 74
End: 2024-10-29
Attending: FAMILY MEDICINE
Payer: MEDICARE

## 2024-10-29 DIAGNOSIS — E78.00 HYPERCHOLESTEROLEMIA: ICD-10-CM

## 2024-10-29 LAB
CHOLEST SERPL-MCNC: 257 MG/DL (ref ?–200)
FASTING PATIENT LIPID ANSWER: YES
HDLC SERPL-MCNC: 78 MG/DL (ref 40–59)
LDLC SERPL CALC-MCNC: 162 MG/DL (ref ?–100)
NONHDLC SERPL-MCNC: 179 MG/DL (ref ?–130)
TRIGL SERPL-MCNC: 97 MG/DL (ref 30–149)
VLDLC SERPL CALC-MCNC: 19 MG/DL (ref 0–30)

## 2024-10-29 PROCEDURE — 36415 COLL VENOUS BLD VENIPUNCTURE: CPT

## 2024-10-29 PROCEDURE — 80061 LIPID PANEL: CPT

## 2025-04-04 ENCOUNTER — OFFICE VISIT (OUTPATIENT)
Dept: FAMILY MEDICINE CLINIC | Facility: CLINIC | Age: 75
End: 2025-04-04
Payer: MEDICARE

## 2025-04-04 VITALS
DIASTOLIC BLOOD PRESSURE: 76 MMHG | HEART RATE: 68 BPM | OXYGEN SATURATION: 98 % | TEMPERATURE: 97 F | WEIGHT: 119 LBS | BODY MASS INDEX: 22.47 KG/M2 | RESPIRATION RATE: 16 BRPM | SYSTOLIC BLOOD PRESSURE: 118 MMHG | HEIGHT: 61 IN

## 2025-04-04 DIAGNOSIS — E78.00 HYPERCHOLESTEROLEMIA: ICD-10-CM

## 2025-04-04 DIAGNOSIS — Z82.49 FAMILY HISTORY OF PREMATURE CAD: ICD-10-CM

## 2025-04-04 DIAGNOSIS — Z00.00 MEDICARE ANNUAL WELLNESS VISIT, SUBSEQUENT: Primary | ICD-10-CM

## 2025-04-04 DIAGNOSIS — M81.0 AGE-RELATED OSTEOPOROSIS WITHOUT CURRENT PATHOLOGICAL FRACTURE: ICD-10-CM

## 2025-04-04 NOTE — PROGRESS NOTES
Subjective:   Mine Crystal is a 75 year old female who presents for a Medicare Subsequent Annual Wellness visit (Pt already had Initial Annual Wellness) and  untreated hypercholesterolemia and untreated osteoporosis .       Osteoporosis:  Patient was prescribed alendronate in 2024 due to diagnosis of osteoporosis.  Patient discontinued taking the alendronate after discussing it with her dentist.      Hypercholesterolemia:  Persistently elevated LDL.  When patient was on rosuvastatin in the past LDL did come down dramatically.  Exercise:  every day, weights, stationary bike, does something every diet.        History/Other:   Fall Risk Assessment:   She has been screened for Falls and is High Risk. Fall Prevention information provided to patient in After Visit Summary.    Do you feel unsteady when standing or walking?: No  Do you worry about falling?: No  Have you fallen in the past year?: Yes  How many times have you fallen?: 1  Were you injured?: No     Cognitive Assessment:   She had a completely normal cognitive assessment - see flowsheet entries     Functional Ability/Status:   Mine Crystal has a completely normal functional assessment. See flowsheet for details.        Depression Screening (PHQ):  PHQ-2 SCORE: 0  , done 4/4/2025   Trouble falling or staying asleep, or sleeping too much: 1     If you checked off any problems, how difficult have these problems made it for you to do your work, take care of things at home, or get along with other people?: Not difficult at all    Last [facility] Suicide Screening on 4/4/2025 was No Risk.         Advanced Directives:   She does have a Living Will but we do NOT have it on file in Epic.    She does have a POA but we do NOT have it on file in [facility].    Patient has Advance Care Planning documents but we do not have a copy in EMR. Discussed Advanced Care Planning with patient and instructed patient to get our office a copy to be scanned into EMR.      Patient Active  Problem List   Diagnosis    Hypercholesterolemia    Age-related osteoporosis without current pathological fracture    Family history of premature CAD    Colonoscopy refused    Primary osteoarthritis of right knee     Allergies:  She is allergic to penicillins.    Current Medications:  Outpatient Medications Marked as Taking for the 4/4/25 encounter (Office Visit) with Belen Hidalgo,    Medication Sig    cholecalciferol 50 MCG (2000 UT) Oral Cap Take 1 capsule (2,000 Units total) by mouth daily.    Calcium-Vitamin D-Vitamin K 600-1000-90 MG-UNT-MCG Oral Tab Take 1 tablet by mouth daily.       Medical History:  She  has a past medical history of Acute delirium, Acute encephalopathy (12/20/2022), Age-related osteoporosis without current pathological fracture (02/13/2020), Anxiety state, Colonoscopy refused (01/14/2022), Current severe episode of major depressive disorder with psychotic features without prior episode (HCC) (12/23/2022), Depression, Essential hypertension (12/28/2019), Family history of premature CAD (02/17/2021), High blood pressure, History of 2019 novel coronavirus disease (COVID-19) (09/15/2022), Hypercholesterolemia (11/24/2019), Lesion of left upper eyelid (11/18/2020), Neoplasm of uncertain behavior of skin of face (10/16/2018), Osteoarthritis, Osteopenia, and Seborrheic keratosis (11/24/2019).  Surgical History:  She  has a past surgical history that includes drain pilonidal cyst simpl (1968); knee replacement surgery (2015); Total abdominal hysterectomy (1994); oophorectomy; total knee replacement; hysterectomy; and correct bunion,othr methods.   Family History:  Her family history includes Cancer in her maternal grandmother; Diabetes in her father; Heart Attack in her mother and paternal grandfather; Heart Disorder in her mother; Stroke in her maternal grandfather.  Social History:  She  reports that she has never smoked. She has never been exposed to tobacco smoke. She has never used  smokeless tobacco. She reports current alcohol use. She reports that she does not use drugs.    Tobacco:  She has never smoked tobacco.    CAGE Alcohol Screen:   CAGE screening score of 0 on 4/4/2025, showing low risk of alcohol abuse.      Patient Care Team:  Belen Hidalgo DO as PCP - General (Family Practice)  Johanne Feliciano PT as Physical Therapist (Physical Therapy)    Review of Systems  GENERAL: feels well overall  SKIN: denies any unusual skin lesions  EYES: denies blurred vision or double vision  HEENT: denies nasal congestion, sinus pain or ST  LUNGS: denies shortness of breath with exertion  CARDIOVASCULAR: denies chest pain on exertion  GI: denies abdominal pain, denies heartburn  : denies dysuria, vaginal discharge or itching, no complaint of urinary incontinence   MUSCULOSKELETAL: denies back pain  NEURO: denies headaches  PSYCH: denies depression or anxiety      Objective:   Physical Exam  General Appearance:  Very pleasant older  female.  Alert, cooperative, no distress, appears stated age   Head:  Normocephalic, without obvious abnormality, atraumatic   Eyes:  conjunctiva/corneas clear, EOM's intact both eyes   Ears:  Hearing grossly normal to finger rub bilaterally   Nose: No nasal discharge   Throat: MMM.  Posterior OP normal without erythema or exudate   Neck: Supple, symmetrical, trachea midline, no adenopathy;  thyroid: not enlarged, symmetric, no tenderness/mass/nodules       Lungs:   Clear to auscultation bilaterally, respirations unlabored   Heart:  Regular rate and rhythm, S1 and S2 normal, no murmur   Abdomen:   Soft, non-tender, no masses, no organomegaly       Extremities: Extremities normal, atraumatic, no cyanosis or edema       Skin: Skin color, texture, turgor normal, no rashes    Lymph nodes: No cervical or supraclavicular adenopathy   Neurologic: Alert and orient x 3.  No gross motor or sensory abnormalities.       /76 (BP Location: Left arm, Patient Position:  Sitting, Cuff Size: adult)   Pulse 68   Temp 96.9 °F (36.1 °C) (Temporal)   Resp 16   Ht 5' 1\" (1.549 m)   Wt 119 lb (54 kg)   SpO2 98%   BMI 22.48 kg/m²  Estimated body mass index is 22.48 kg/m² as calculated from the following:    Height as of this encounter: 5' 1\" (1.549 m).    Weight as of this encounter: 119 lb (54 kg).    Medicare Hearing Assessment:   Hearing Screening    Time taken: 4/4/2025 12:07 PM  Screening Method: Finger Rub  Finger Rub Result: Pass             DATA:    Interpretation   PROCEDURE:  XR DEXA BONE DENSITOMETRY (CPT=77080)     COMPARISON:  [facility], XR, XR DEXA BONE DENSITOMETRY (CPT=77080), 2/12/2022, 10:57 AM.     INDICATIONS:  Z78.0 Postmenopausal M81.0 Age-related osteoporosis without current pathological fracture     PATIENT STATED HISTORY: (As transcribed by Technologist)  Osteoporosis.           LUMBAR SPINE ANALYSIS RESULTS:      Bone mineral density (BMD) (g/cm2):  0.876    Lumbar T-Score:  -1.6      % young normals:  84      % age matched controls:  112      Change from prior spine examination:  2.3%               TOTAL HIP ANALYSIS RESULTS:        Bone mineral density (BMD) (g/cm2):  0.674      Total Hip T-Score:  -2.2      % young normals:  72      % age matched controls:  93      Change from prior hip examination:  -9.0%               FEMORAL NECK ANALYSIS RESULTS:        Bone mineral density (BMD) (g/cm2):  0.543      Femoral neck T-Score:  -2.8      % young normals:  64      % age matched controls:  88      Change from prior hip examination:  -1.0%            ADDITIONAL FINDINGS:  No significant additional findings.       =====  CONCLUSION:  Bone mineral density values for femoral neck are compatible with the diagnosis of osteoporosis by WHO definition (T score less than -2.5). If therapy is initiated, a follow-up study in 1 year may aid in evaluation of therapeutic efficacy.      Recommendation:  The National Osteoporosis Foundation (NOF) recommends pharmacological  treatment for patients with a FRAX 10-year risk score of 3% or higher for a hip fracture or 20% or higher for a major osteoporotic fracture, to prevent osteoporosis   and reduce fracture risk.     The World Health Organization has defined the following categories based on bone density:  Normal bone:  T-score greater than or equal to -1  Osteopenia: T-score  less than -1 and greater  than -2.5  Osteoporosis:  T-score less than or equal -2.5     LOCATION:  [facility]    Dictated by (CST): Jennifer Stanley MD on 5/08/2024 at 2:55 PM       Finalized by (CST): Jennifer Stanley MD on 5/08/2024 at 2:55 PM        Component      Latest Ref Rng 1/13/2021 6/4/2021 1/28/2022 6/8/2022 12/24/2022   Cholesterol, Total      <200 mg/dL 269 (H)  164  167  214 (H)  182    HDL Cholesterol      40 - 59 mg/dL 68 (H)  70 (H)  71 (H)  70 (H)  47    Triglycerides      30 - 149 mg/dL 85  85  85  91  86    LDL Cholesterol Calc      <100 mg/dL 184 (H)  78  80  128 (H)  119 (H)    VLDL      0 - 30 mg/dL 17  13  13  16  15    NON-HDL CHOLESTEROL      <130 mg/dL 201 (H)  94  96  144 (H)  135 (H)    Patient Fasting? Yes  Yes       Patient Fasting for Lipid?   Yes  Yes       Component      Latest Ref Rng 5/22/2024 10/29/2024   Cholesterol, Total      <200 mg/dL 253 (H)  257 (H)    HDL Cholesterol      40 - 59 mg/dL 82 (H)  78 (H)    Triglycerides      30 - 149 mg/dL 96  97    LDL Cholesterol Calc      <100 mg/dL 155 (H)  162 (H)    VLDL      0 - 30 mg/dL 18  19    NON-HDL CHOLESTEROL      <130 mg/dL 171 (H)  179 (H)    Patient Fasting?     Patient Fasting for Lipid? Yes  Yes           Assessment & Plan:   Mine Crystal is a 75 year old female who presents for a Medicare Assessment.       Encounter Diagnoses   Name Primary?    Medicare annual wellness visit, subsequent Yes    Hypercholesterolemia     Family history of premature CAD     Age-related osteoporosis without current pathological fracture          1. Medicare annual wellness visit, subsequent  Patient  provided info on prevention.    2. Hypercholesterolemia  3. Family history of premature CAD  Persistently elevated LDL in the setting of family history of premature coronary artery disease.  When patient was on rosuvastatin in the past LDL did come down dramatically.  Patient stopped taking the rosuvastatin because she did not want to take any medication.  Recommend continue with healthy lifestyle.  Order for CT calcium scoring heart scan if patient desires to do it.  Labs ordered and pending, follow-up pending results.    - CBC With Differential With Platelet; Future  - Comp Metabolic Panel (14); Future  - Lipid Panel; Future  - Assay, Thyroid Stim Hormone; Future  - Free T4, (Free Thyroxine); Future  - CT CALCIUM SCORING; Future    4. Age-related osteoporosis without current pathological fracture  Patient was prescribed alendronate in 2024 due to diagnosis of osteoporosis.  Patient discontinued taking the alendronate after discussing it with her dentist.  Recommend continue taking calcium and vitamin D supplements.  Recommend continue with exercising on a daily basis.  Patient referred to Dr. Noriega for further evaluation and care.    - Endocrine Referral - In Network            Orders Placed This Encounter   Procedures    CBC With Differential With Platelet    Comp Metabolic Panel (14)    Lipid Panel    Assay, Thyroid Stim Hormone    Free T4, (Free Thyroxine)       Imaging & Consults:  ENDOCRINOLOGY - INTERNAL  CT CALCIUM SCORING        1. Medicare annual wellness visit, subsequent (Primary)  2. Hypercholesterolemia  -     CBC With Differential With Platelet; Future; Expected date: 04/04/2025  -     Comp Metabolic Panel (14); Future; Expected date: 04/04/2025  -     Lipid Panel; Future; Expected date: 04/04/2025  -     Assay, Thyroid Stim Hormone; Future; Expected date: 04/04/2025  -     Free T4, (Free Thyroxine); Future; Expected date: 04/04/2025  -     CT CALCIUM SCORING; Future; Expected date: 04/04/2025  3.  Family history of premature CAD  Overview:  Mother had fatal MI age 58 yo.    4. Age-related osteoporosis without current pathological fracture  Overview:  2/12/2022, 1/4/2020 DEXA  Orders:  -     Endocrine Referral - In Network    The patient indicates understanding of these issues and agrees to the plan.  Reinforced healthy diet, lifestyle, and exercise.      Return in about 4 weeks (around 5/2/2025) for Hypercholesterolemia pending blood test results.     Belen Hidalgo DO, 4/4/2025     Supplementary Documentation:   General Health:  In the past six months, have you lost more than 10 pounds without trying?: 2 - No  Has your appetite been poor?: No  Type of Diet: Balanced  How does the patient maintain a good energy level?: Appropriate Exercise, Daily Walks, Stretching  How would you describe your daily physical activity?: Moderate  How would you describe your current health state?: Good  How do you maintain positive mental well-being?: Social Interaction, Puzzles, Games, Visiting Family  On a scale of 0 to 10, with 0 being no pain and 10 being severe pain, what is your pain level?: 0 - (None)  In the past six months, have you experienced urine leakage?: 0-No  At any time do you feel concerned for the safety/well-being of yourself and/or your children, in your home or elsewhere?: No  Have you had any immunizations at another office such as Influenza, Hepatitis B, Tetanus, or Pneumococcal?: Yes

## 2025-04-07 ENCOUNTER — LAB ENCOUNTER (OUTPATIENT)
Dept: LAB | Age: 75
End: 2025-04-07
Attending: FAMILY MEDICINE
Payer: MEDICARE

## 2025-04-07 DIAGNOSIS — E78.00 HYPERCHOLESTEROLEMIA: ICD-10-CM

## 2025-04-07 LAB
ALBUMIN SERPL-MCNC: 4.8 G/DL (ref 3.2–4.8)
ALBUMIN/GLOB SERPL: 1.9 {RATIO} (ref 1–2)
ALP LIVER SERPL-CCNC: 70 U/L
ALT SERPL-CCNC: 15 U/L
ANION GAP SERPL CALC-SCNC: 9 MMOL/L (ref 0–18)
AST SERPL-CCNC: 20 U/L (ref ?–34)
BASOPHILS # BLD AUTO: 0.04 X10(3) UL (ref 0–0.2)
BASOPHILS NFR BLD AUTO: 0.8 %
BILIRUB SERPL-MCNC: 0.9 MG/DL (ref 0.2–1.1)
BUN BLD-MCNC: 17 MG/DL (ref 9–23)
CALCIUM BLD-MCNC: 10 MG/DL (ref 8.7–10.6)
CHLORIDE SERPL-SCNC: 104 MMOL/L (ref 98–112)
CHOLEST SERPL-MCNC: 246 MG/DL (ref ?–200)
CO2 SERPL-SCNC: 29 MMOL/L (ref 21–32)
CREAT BLD-MCNC: 0.76 MG/DL
EGFRCR SERPLBLD CKD-EPI 2021: 81 ML/MIN/1.73M2 (ref 60–?)
EOSINOPHIL # BLD AUTO: 0.08 X10(3) UL (ref 0–0.7)
EOSINOPHIL NFR BLD AUTO: 1.6 %
ERYTHROCYTE [DISTWIDTH] IN BLOOD BY AUTOMATED COUNT: 14.3 %
FASTING PATIENT LIPID ANSWER: YES
FASTING STATUS PATIENT QL REPORTED: YES
GLOBULIN PLAS-MCNC: 2.5 G/DL (ref 2–3.5)
GLUCOSE BLD-MCNC: 101 MG/DL (ref 70–99)
HCT VFR BLD AUTO: 45.1 %
HDLC SERPL-MCNC: 81 MG/DL (ref 40–59)
HGB BLD-MCNC: 15 G/DL
IMM GRANULOCYTES # BLD AUTO: 0.01 X10(3) UL (ref 0–1)
IMM GRANULOCYTES NFR BLD: 0.2 %
LDLC SERPL CALC-MCNC: 149 MG/DL (ref ?–100)
LYMPHOCYTES # BLD AUTO: 1.2 X10(3) UL (ref 1–4)
LYMPHOCYTES NFR BLD AUTO: 23.3 %
MCH RBC QN AUTO: 30.5 PG (ref 26–34)
MCHC RBC AUTO-ENTMCNC: 33.3 G/DL (ref 31–37)
MCV RBC AUTO: 91.7 FL
MONOCYTES # BLD AUTO: 0.36 X10(3) UL (ref 0.1–1)
MONOCYTES NFR BLD AUTO: 7 %
NEUTROPHILS # BLD AUTO: 3.45 X10 (3) UL (ref 1.5–7.7)
NEUTROPHILS # BLD AUTO: 3.45 X10(3) UL (ref 1.5–7.7)
NEUTROPHILS NFR BLD AUTO: 67.1 %
NONHDLC SERPL-MCNC: 165 MG/DL (ref ?–130)
OSMOLALITY SERPL CALC.SUM OF ELEC: 296 MOSM/KG (ref 275–295)
PLATELET # BLD AUTO: 282 10(3)UL (ref 150–450)
POTASSIUM SERPL-SCNC: 4.5 MMOL/L (ref 3.5–5.1)
PROT SERPL-MCNC: 7.3 G/DL (ref 5.7–8.2)
RBC # BLD AUTO: 4.92 X10(6)UL
SODIUM SERPL-SCNC: 142 MMOL/L (ref 136–145)
T4 FREE SERPL-MCNC: 1.4 NG/DL (ref 0.8–1.7)
TRIGL SERPL-MCNC: 95 MG/DL (ref 30–149)
TSI SER-ACNC: 1.21 UIU/ML (ref 0.55–4.78)
VLDLC SERPL CALC-MCNC: 18 MG/DL (ref 0–30)
WBC # BLD AUTO: 5.1 X10(3) UL (ref 4–11)

## 2025-04-07 PROCEDURE — 85025 COMPLETE CBC W/AUTO DIFF WBC: CPT

## 2025-04-07 PROCEDURE — 84439 ASSAY OF FREE THYROXINE: CPT

## 2025-04-07 PROCEDURE — 84443 ASSAY THYROID STIM HORMONE: CPT

## 2025-04-07 PROCEDURE — 80061 LIPID PANEL: CPT

## 2025-04-07 PROCEDURE — 80053 COMPREHEN METABOLIC PANEL: CPT

## 2025-04-07 PROCEDURE — 36415 COLL VENOUS BLD VENIPUNCTURE: CPT

## 2025-05-02 ENCOUNTER — OFFICE VISIT (OUTPATIENT)
Dept: FAMILY MEDICINE CLINIC | Facility: CLINIC | Age: 75
End: 2025-05-02
Payer: MEDICARE

## 2025-05-02 VITALS
BODY MASS INDEX: 22.28 KG/M2 | HEART RATE: 70 BPM | TEMPERATURE: 97 F | WEIGHT: 118 LBS | DIASTOLIC BLOOD PRESSURE: 72 MMHG | SYSTOLIC BLOOD PRESSURE: 124 MMHG | OXYGEN SATURATION: 97 % | HEIGHT: 61 IN | RESPIRATION RATE: 15 BRPM

## 2025-05-02 DIAGNOSIS — Z82.49 FAMILY HISTORY OF PREMATURE CAD: ICD-10-CM

## 2025-05-02 DIAGNOSIS — E78.00 HYPERCHOLESTEROLEMIA: Primary | ICD-10-CM

## 2025-05-02 DIAGNOSIS — M81.0 AGE-RELATED OSTEOPOROSIS WITHOUT CURRENT PATHOLOGICAL FRACTURE: ICD-10-CM

## 2025-05-02 PROCEDURE — 99213 OFFICE O/P EST LOW 20 MIN: CPT | Performed by: FAMILY MEDICINE

## 2025-05-02 PROCEDURE — G2211 COMPLEX E/M VISIT ADD ON: HCPCS | Performed by: FAMILY MEDICINE

## 2025-05-02 NOTE — PROGRESS NOTES
Mine Crystal is a 75 year old female.     Chief Complaint   Patient presents with    Hypercholesterolemia    Lab Results     4/7    Osteoporosis         HPI:         Hypercholesterolemia:  Persistently elevated LDL.  When patient was on rosuvastatin in the past LDL did come down dramatically.  Exercise:  every day, weights, stationary bike, does something every day.  Diet:  somewhat watches diet, endorses has cut down on sausage, doesn't eat a great deal of sweets.          Wt Readings from Last 6 Encounters:   05/02/25 118 lb (53.5 kg)   04/04/25 119 lb (54 kg)   09/30/24 119 lb (54 kg)   08/30/24 120 lb (54.4 kg)   07/29/24 120 lb (54.4 kg)   04/18/24 119 lb (54 kg)      Body mass index is 22.3 kg/m².        Current Medications[1]   Past Medical History[2]   Past Surgical History[3]   Social History:    Short Social Hx on File[4]    Family History[5]  REVIEW OF SYSTEMS:   GENERAL HEALTH: Overall feels well.    INTEGUMENT: denies any unusual skin lesions or rashes   RESPIRATORY: Denies: GERMAN/MENDIOLA/Cough  CARDIOVASCULAR: Denies CP/palpitations  VASCULAR: Denies LE edema, denies claudication type symptoms  GI: No complaints of abdominal pain/nausea/vomiting/blood in stool/black stool/bloating/constipation/diarrhea  : No complaints of urinary symptoms  NEURO: No complaints of headaches/dizziness  PSYCH: denies depression and anxiety    Immunization History   Administered Date(s) Administered    Covid-19 Vaccine Moderna 100 mcg/0.5 ml 03/02/2021, 03/30/2021, 11/18/2021    Covid-19 Vaccine Moderna Bivalent 50mcg/0.5mL 12+ years 11/21/2022    FLU VAC High Dose 65 YRS & Older PRSV Free (21624) 10/16/2020, 10/27/2021, 10/19/2022, 10/19/2023    FLUAD High Dose 65 yr and older (91457) 10/16/2018    Fluvirin, 3 Years & >, Im 10/18/2014    High Dose Fluzone Influenza Vaccine, 65yr+ PF 0.5mL (71481) 10/04/2016, 10/08/2017, 10/05/2019, 10/16/2020, 10/27/2021, 10/19/2023, 09/09/2024    Moderna Covid-19 Vaccine 50mcg/0.5ml  12yrs+ 09/09/2024    Pneumococcal (Prevnar 13) 11/15/2019    Pneumovax 23 11/18/2020    TDAP 10/08/2015    Zoster Vaccine Live (Zostavax) 08/15/2014    Zoster Vaccine Recombinant Adjuvanted (Shingrix) 09/10/2020, 11/02/2020   Deferred Date(s) Deferred    FLU VAC High Dose 65 YRS & Older PRSV Free ([id number]) 10/05/2022       EXAM:   /72   Pulse 70   Temp 97.4 °F (36.3 °C) (Temporal)   Resp 15   Ht 5' 1\" (1.549 m)   Wt 118 lb (53.5 kg)   SpO2 97%   BMI 22.30 kg/m²   GENERAL: NAD, pleasant well-appearing older  female  INTEGUMENT: no visible rashes  HEAD: NCAT  LUNGS: CTA A/P no wheezes/ronchi/rales/crackles  CARDIO: RRR, +S1/S2, no mm  VASCULAR: No lower extremity edema  EXTREMITIES: no cyanosis or clubbing  NEURO: Alert and Oriented x3.  No gross motor or gross sensory abnormalities.  PSYCH: Affect normal.  Normal thought content.        DATA:    Cholesterol:     Lab Results   Component Value Date    CHOLEST 246 (H) 04/07/2025    CHOLEST 257 (H) 10/29/2024    CHOLEST 253 (H) 05/22/2024     Lab Results   Component Value Date    HDL 81 (H) 04/07/2025    HDL 78 (H) 10/29/2024    HDL 82 (H) 05/22/2024     Lab Results   Component Value Date    TRIG 95 04/07/2025    TRIG 97 10/29/2024    TRIG 96 05/22/2024     Lab Results   Component Value Date     (H) 04/07/2025     (H) 10/29/2024     (H) 05/22/2024     Lab Results   Component Value Date    AST 20 04/07/2025    AST 20 05/22/2024    AST 11 (L) 12/24/2022     Lab Results   Component Value Date    ALT 15 04/07/2025    ALT 23 05/22/2024    ALT 14 12/24/2022           ASSESSMENT AND PLAN:       Encounter Diagnoses   Name Primary?    Hypercholesterolemia Yes    Family history of premature CAD     Age-related osteoporosis without current pathological fracture        Patient declines pharmacotherapy for treatment of osteoporosis.      1. Hypercholesterolemia  2. Family history of premature CAD  Statin recommended, patient refuses/declines  statin therapy.  Patient was on statin therapy in the past which she tolerated well and which dramatically decrease her LDL.  Recommend discontinue any further evaluation of lipid panel in the future as patient declines pharmacologic therapy.    3. Age-related osteoporosis without current pathological fracture  Patient declines pharmacotherapy for treatment of osteoporosis.  Recommend continue calcium and vitamin D supplements.  Recommend daily weightbearing exercises such as walking.        Return in about 11 months (around 4/2/2026).           [1]   Current Outpatient Medications   Medication Sig Dispense Refill    Boswellia-Glucosamine-Vit D (OSTEO BI-FLEX ONE PER DAY OR) Take by mouth.      cholecalciferol 50 MCG (2000 UT) Oral Cap Take 1 capsule (2,000 Units total) by mouth daily.      Calcium-Vitamin D-Vitamin K 600-1000-90 MG-UNT-MCG Oral Tab Take 1 tablet by mouth daily.     [2]   Past Medical History:   Acute delirium    Acute encephalopathy    Age-related osteoporosis without current pathological fracture    1/4/2020 DEXA    Anxiety state    Colonoscopy refused    Current severe episode of major depressive disorder with psychotic features without prior episode (HCC)    This occurred postoperatively      Depression    Essential hypertension    12/13/2019 New Diagnosis    Family history of premature CAD    Mother had fatal MI age 58 yo.    High blood pressure    History of 2019 novel coronavirus disease (COVID-19)    July 2022    Hypercholesterolemia    Lesion of left upper eyelid    Neoplasm of uncertain behavior of skin of face    Osteoarthritis    Osteopenia    Seborrheic keratosis   [3]   Past Surgical History:  Procedure Laterality Date    Correct bunion,othr methods      Drain pilonidal cyst simpl  1968    Hysterectomy      Knee replacement surgery  2015    [facility]    Oophorectomy      @ age 44    Total abdominal hysterectomy  1994    [facility]    Total knee replacement     [4]   Social  History  Socioeconomic History    Marital status: Single   Tobacco Use    Smoking status: Never     Passive exposure: Never    Smokeless tobacco: Never   Vaping Use    Vaping status: Never Used   Substance and Sexual Activity    Alcohol use: Yes     Comment: Occ    Drug use: No    Sexual activity: Yes     Partners: Male   Other Topics Concern     Service No    Blood Transfusions No    Caffeine Concern Yes     Comment: 2 cups coffee daily    Occupational Exposure No    Hobby Hazards No    Sleep Concern No    Stress Concern No    Weight Concern Yes    Special Diet No    Back Care No    Exercise Yes     Comment: Bike, weights and step climber at home everyday    Bike Helmet No    Seat Belt Yes    Self-Exams No   [5]   Family History  Problem Relation Age of Onset    Diabetes Father     Heart Disorder Mother     Heart Attack Mother     Cancer Maternal Grandmother     Stroke Maternal Grandfather     Heart Attack Paternal Grandfather

## 2025-06-30 ENCOUNTER — APPOINTMENT (OUTPATIENT)
Dept: CT IMAGING | Age: 75
End: 2025-06-30
Attending: EMERGENCY MEDICINE
Payer: MEDICARE

## 2025-06-30 ENCOUNTER — HOSPITAL ENCOUNTER (EMERGENCY)
Age: 75
Discharge: HOME | End: 2025-06-30
Attending: EMERGENCY MEDICINE
Payer: MEDICARE

## 2025-06-30 VITALS
RESPIRATION RATE: 18 BRPM | SYSTOLIC BLOOD PRESSURE: 154 MMHG | DIASTOLIC BLOOD PRESSURE: 80 MMHG | WEIGHT: 117 LBS | HEART RATE: 77 BPM | BODY MASS INDEX: 22.09 KG/M2 | OXYGEN SATURATION: 98 % | HEIGHT: 61 IN | TEMPERATURE: 99 F

## 2025-06-30 DIAGNOSIS — M54.50 BACK PAIN OF THORACOLUMBAR REGION: Primary | ICD-10-CM

## 2025-06-30 DIAGNOSIS — M54.6 BACK PAIN OF THORACOLUMBAR REGION: Primary | ICD-10-CM

## 2025-06-30 LAB
ALBUMIN SERPL-MCNC: 4.9 G/DL (ref 3.2–4.8)
ALBUMIN/GLOB SERPL: 2 {RATIO} (ref 1–2)
ALP LIVER SERPL-CCNC: 69 U/L (ref 55–142)
ALT SERPL-CCNC: 11 U/L (ref 10–49)
ANION GAP SERPL CALC-SCNC: 9 MMOL/L (ref 0–18)
AST SERPL-CCNC: 19 U/L (ref ?–34)
BASOPHILS # BLD AUTO: 0.03 X10(3) UL (ref 0–0.2)
BASOPHILS NFR BLD AUTO: 0.5 %
BILIRUB SERPL-MCNC: 0.8 MG/DL (ref 0.2–1.1)
BILIRUB UR QL STRIP.AUTO: NEGATIVE
BUN BLD-MCNC: 14 MG/DL (ref 9–23)
CALCIUM BLD-MCNC: 10.3 MG/DL (ref 8.7–10.6)
CHLORIDE SERPL-SCNC: 101 MMOL/L (ref 98–112)
CO2 SERPL-SCNC: 27 MMOL/L (ref 21–32)
COLOR UR AUTO: YELLOW
CREAT BLD-MCNC: 0.82 MG/DL (ref 0.55–1.02)
EGFRCR SERPLBLD CKD-EPI 2021: 74 ML/MIN/1.73M2 (ref 60–?)
EOSINOPHIL # BLD AUTO: 0.04 X10(3) UL (ref 0–0.7)
EOSINOPHIL NFR BLD AUTO: 0.7 %
ERYTHROCYTE [DISTWIDTH] IN BLOOD BY AUTOMATED COUNT: 14.2 %
GLOBULIN PLAS-MCNC: 2.4 G/DL (ref 2–3.5)
GLUCOSE BLD-MCNC: 96 MG/DL (ref 70–99)
GLUCOSE UR STRIP.AUTO-MCNC: NEGATIVE MG/DL
HCT VFR BLD AUTO: 44.2 % (ref 35–48)
HGB BLD-MCNC: 15.3 G/DL (ref 12–16)
IMM GRANULOCYTES # BLD AUTO: 0.03 X10(3) UL (ref 0–1)
IMM GRANULOCYTES NFR BLD: 0.5 %
KETONES UR STRIP.AUTO-MCNC: NEGATIVE MG/DL
LIPASE SERPL-CCNC: 39 U/L (ref 12–53)
LYMPHOCYTES # BLD AUTO: 1.46 X10(3) UL (ref 1–4)
LYMPHOCYTES NFR BLD AUTO: 26.4 %
MCH RBC QN AUTO: 31.2 PG (ref 26–34)
MCHC RBC AUTO-ENTMCNC: 34.6 G/DL (ref 31–37)
MCV RBC AUTO: 90.2 FL (ref 80–100)
MONOCYTES # BLD AUTO: 0.39 X10(3) UL (ref 0.1–1)
MONOCYTES NFR BLD AUTO: 7.1 %
NEUTROPHILS # BLD AUTO: 3.58 X10 (3) UL (ref 1.5–7.7)
NEUTROPHILS # BLD AUTO: 3.58 X10(3) UL (ref 1.5–7.7)
NEUTROPHILS NFR BLD AUTO: 64.8 %
NITRITE UR QL STRIP.AUTO: NEGATIVE
OSMOLALITY SERPL CALC.SUM OF ELEC: 284 MOSM/KG (ref 275–295)
PH UR STRIP.AUTO: 7 [PH] (ref 5–8)
PLATELET # BLD AUTO: 269 10(3)UL (ref 150–450)
POTASSIUM SERPL-SCNC: 4 MMOL/L (ref 3.5–5.1)
PROT SERPL-MCNC: 7.3 G/DL (ref 5.7–8.2)
PROT UR STRIP.AUTO-MCNC: NEGATIVE MG/DL
RBC # BLD AUTO: 4.9 X10(6)UL (ref 3.8–5.3)
RBC UR QL AUTO: NEGATIVE
SODIUM SERPL-SCNC: 137 MMOL/L (ref 136–145)
SP GR UR STRIP.AUTO: 1.01 (ref 1–1.03)
UROBILINOGEN UR STRIP.AUTO-MCNC: 0.2 MG/DL
WBC # BLD AUTO: 5.5 X10(3) UL (ref 4–11)

## 2025-06-30 PROCEDURE — 99284 EMERGENCY DEPT VISIT MOD MDM: CPT

## 2025-06-30 PROCEDURE — 87086 URINE CULTURE/COLONY COUNT: CPT | Performed by: EMERGENCY MEDICINE

## 2025-06-30 PROCEDURE — 80053 COMPREHEN METABOLIC PANEL: CPT | Performed by: EMERGENCY MEDICINE

## 2025-06-30 PROCEDURE — 74176 CT ABD & PELVIS W/O CONTRAST: CPT | Performed by: EMERGENCY MEDICINE

## 2025-06-30 PROCEDURE — 81015 MICROSCOPIC EXAM OF URINE: CPT | Performed by: EMERGENCY MEDICINE

## 2025-06-30 PROCEDURE — 81001 URINALYSIS AUTO W/SCOPE: CPT | Performed by: EMERGENCY MEDICINE

## 2025-06-30 PROCEDURE — 85025 COMPLETE CBC W/AUTO DIFF WBC: CPT | Performed by: EMERGENCY MEDICINE

## 2025-06-30 PROCEDURE — 96374 THER/PROPH/DIAG INJ IV PUSH: CPT

## 2025-06-30 PROCEDURE — 83690 ASSAY OF LIPASE: CPT | Performed by: EMERGENCY MEDICINE

## 2025-06-30 RX ORDER — KETOROLAC TROMETHAMINE 15 MG/ML
15 INJECTION, SOLUTION INTRAMUSCULAR; INTRAVENOUS ONCE
Status: COMPLETED | OUTPATIENT
Start: 2025-06-30 | End: 2025-06-30

## 2025-06-30 RX ORDER — CYCLOBENZAPRINE HCL 10 MG
10 TABLET ORAL 3 TIMES DAILY PRN
Qty: 20 TABLET | Refills: 0 | Status: SHIPPED | OUTPATIENT
Start: 2025-06-30 | End: 2025-07-07

## 2025-06-30 RX ORDER — HYDROCODONE BITARTRATE AND ACETAMINOPHEN 5; 325 MG/1; MG/1
1-2 TABLET ORAL EVERY 6 HOURS PRN
Qty: 10 TABLET | Refills: 0 | Status: SHIPPED | OUTPATIENT
Start: 2025-06-30 | End: 2025-07-05

## 2025-06-30 RX ORDER — METHYLPREDNISOLONE 4 MG/1
TABLET ORAL
Qty: 1 EACH | Refills: 0 | Status: SHIPPED | OUTPATIENT
Start: 2025-06-30 | End: 2025-07-14 | Stop reason: ALTCHOICE

## 2025-06-30 NOTE — ED PROVIDER NOTES
Patient Seen in: [facility] In [place]        History  Chief Complaint   Patient presents with    Abdomen/Flank Pain     Stated Complaint: L flank pain    Subjective:   HPI            75-year-old female presents for evaluation of left flank pain.  Patient has had left flank pain that seems to be worse when she is seated for the past 4 days.  Possibly started after she was lifting some heavy objects in her garage.  No urinary symptoms.  No radiation down her leg.  No numbness or weakness of the extremities.  No associated abdominal pain.  No fever or shaking chills      Objective:     Past Medical History:    Acute delirium    Acute encephalopathy    Age-related osteoporosis without current pathological fracture    1/4/2020 DEXA    Anxiety state    Colonoscopy refused    Current severe episode of major depressive disorder with psychotic features without prior episode (HCC)    This occurred postoperatively      Depression    Essential hypertension    12/13/2019 New Diagnosis    Family history of premature CAD    Mother had fatal MI age 60 yo.    High blood pressure    History of 2019 novel coronavirus disease (COVID-19)    July 2022    Hypercholesterolemia    Lesion of left upper eyelid    Neoplasm of uncertain behavior of skin of face    Osteoarthritis    Osteopenia    Seborrheic keratosis              Past Surgical History:   Procedure Laterality Date    Correct bunion,othr methods      Drain pilonidal cyst simpl  1968    Hysterectomy      Knee replacement surgery  2015    [facility]    Oophorectomy      @ age 44    Total abdominal hysterectomy  1994    [facility]    Total knee replacement                  Social History     Socioeconomic History    Marital status: Single   Tobacco Use    Smoking status: Never     Passive exposure: Never    Smokeless tobacco: Never   Vaping Use    Vaping status: Never Used   Substance and Sexual Activity    Alcohol use: Yes     Comment: Occ    Drug  use: No    Sexual activity: Yes     Partners: Male   Other Topics Concern     Service No    Blood Transfusions No    Caffeine Concern Yes     Comment: 2 cups coffee daily    Occupational Exposure No    Hobby Hazards No    Sleep Concern No    Stress Concern No    Weight Concern Yes    Special Diet No    Back Care No    Exercise Yes     Comment: Bike, weights and step climber at home everyday    Bike Helmet No    Seat Belt Yes    Self-Exams No                                Physical Exam    ED Triage Vitals [06/30/25 1112]   BP (!) 192/80   Pulse 80   Resp 16   Temp 98 °F (36.7 °C)   Temp src    SpO2 100 %   O2 Device None (Room air)       Current Vitals:   Vital Signs  BP: (!) 192/80  Pulse: 80  Resp: 16  Temp: 98 °F (36.7 °C)    Oxygen Therapy  SpO2: 100 %  O2 Device: None (Room air)            Physical Exam     General: Alert, oriented, no apparent distress  HEENT:  Pupils equal reactive.  Extraocular motions intact.   Neck: Supple  Lungs: Clear to auscultation bilaterally.  Heart: Regular rate and rhythm.  Abdomen: Soft, nontender. Left CVAT  Skin: No rash.  No edema.  Neurologic: No focal neurologic deficits.  Normal speech pattern  Musculoskeletal: No tenderness or deformity noted.  Full range of motion throughout.          ED Course  Labs Reviewed   COMP METABOLIC PANEL (14) - Abnormal; Notable for the following components:       Result Value    Albumin 4.9 (*)     All other components within normal limits   URINALYSIS WITH CULTURE REFLEX - Abnormal; Notable for the following components:    Clarity Urine Hazy (*)     Leukocyte Esterase Urine Small (*)     All other components within normal limits   UA MICROSCOPIC ONLY, URINE - Abnormal; Notable for the following components:    Bacteria Urine Rare (*)     Squamous Epi. Cells Few (*)     All other components within normal limits   LIPASE - Normal   CBC WITH DIFFERENTIAL WITH PLATELET   URINE CULTURE, ROUTINE                            MDM     Differential  diagnosis includes musculoskeletal back pain, renal colic, UTI      Medications   ketorolac (Toradol) 15 MG/ML injection 15 mg (15 mg Intravenous Given 6/30/25 1129)     Better after medication    CBC chemistry and urine are unremarkable    CT ABDOMEN+PELVIS KIDNEYSTONE 2D RNDR(NO IV,NO ORAL)(CPT=74176)  Result Date: 6/30/2025  PROCEDURE: CT ABDOMEN+PELVIS KIDNEYSTONE 2D RNDR(NO IV,NO ORAL)(CPT=74176) INDICATIONS: L flank pain COMPARISON: None TECHNIQUE: Multi-slice CT images of the abdomen and pelvis were performed without intravenous contrast material. Automated exposure control techniques for dose reduction were used, adjustment of the mA and/or kV were based on patient's size. Use of iterative reconstruction technique for dose reduction was used. Dose information is transmitted to the ACR (American College of Radiology) NRDR (National Radiology Data Registry) which includes the Dose Index Registry. FINDINGS: No sign of hydronephrosis, kidney enlargement, perinephric stranding or other features of urinary tract obstruction. Only a small amount of urine within the urinary bladder, assessment limited in the nondistended state. No definite abnormalities are seen. No bladder stone is identified. There are atherosclerotic changes including calcification involving the aorta, but no evidence for aneurysm involving the aorta. Left hepatic lobe cyst. Note is made of degenerative changes present in the spine.     CONCLUSION: No kidney stone or hydronephrosis. Electronically Verified and Signed by Attending Radiologist: Aaron Franklin MD 6/30/2025 12:17 PM Workstation: [id number]    Most likely musculoskeletal back pain.  Plan for Medrol and Flexeril.  Norco as needed for breakthrough pain.  Follow-up in 1 week with PCP if symptoms persist.  Return here sooner if symptoms worsen or new symptoms develop    Medical Decision Making      Disposition and Plan     Clinical Impression:  1. Back pain of thoracolumbar region          Disposition:  Discharge  6/30/2025 12:39 pm    Follow-up:  [facility], [address] [place] [address] [place] [id number] [phone]  Call  choose option 2 for neurosurgery or pain follow up          Medications Prescribed:  Current Discharge Medication List        START taking these medications    Details   methylPREDNISolone (MEDROL) 4 MG Oral Tablet Therapy Pack Dosepack: take as directed  Qty: 1 each, Refills: 0      cyclobenzaprine 10 MG Oral Tab Take 1 tablet (10 mg total) by mouth 3 (three) times daily as needed for Muscle spasms.  Qty: 20 tablet, Refills: 0    Associated Diagnoses: Back pain of thoracolumbar region      HYDROcodone-acetaminophen 5-325 MG Oral Tab Take 1-2 tablets by mouth every 6 (six) hours as needed for Pain.  Qty: 10 tablet, Refills: 0    Associated Diagnoses: Back pain of thoracolumbar region                   Supplementary Documentation:

## 2025-06-30 NOTE — DISCHARGE INSTRUCTIONS
Start Medrol Dosepak as anti-inflammatory.    Flexeril 3 times daily as muscle relaxant.  This may cause some lightheadedness.  Recommend starting in evening    Tylenol 1 g 3 times daily as needed for pain.      Norco as needed for breakthrough pain.  This may cause some sedation so take precautions    Follow-up in 1 week either with your primary care physician or with our back pain clinic, phone number was provided in your discharge paperwork

## 2025-07-14 ENCOUNTER — OFFICE VISIT (OUTPATIENT)
Dept: FAMILY MEDICINE CLINIC | Facility: CLINIC | Age: 75
End: 2025-07-14
Payer: MEDICARE

## 2025-07-14 VITALS
HEIGHT: 61 IN | DIASTOLIC BLOOD PRESSURE: 78 MMHG | RESPIRATION RATE: 20 BRPM | BODY MASS INDEX: 22.28 KG/M2 | HEART RATE: 80 BPM | OXYGEN SATURATION: 98 % | TEMPERATURE: 97 F | SYSTOLIC BLOOD PRESSURE: 124 MMHG | WEIGHT: 118 LBS

## 2025-07-14 DIAGNOSIS — M54.6 THORACOLUMBAR BACK PAIN: Primary | ICD-10-CM

## 2025-07-14 DIAGNOSIS — M54.50 THORACOLUMBAR BACK PAIN: Primary | ICD-10-CM

## 2025-07-14 PROCEDURE — 99213 OFFICE O/P EST LOW 20 MIN: CPT | Performed by: FAMILY MEDICINE

## 2025-07-14 NOTE — PROGRESS NOTES
Mine Crystal is a 75 year old female.     Chief Complaint   Patient presents with    Back Pain         HPI:       Back pain:  Thoracolumbar area on the left.  6/30/2025 went to the [facility] ER.  Patient endorses this occurred due to lifting.  Pain has resolved.  Denies numbness or tingling of lower extremities.          Wt Readings from Last 6 Encounters:   07/14/25 118 lb (53.5 kg)   06/30/25 117 lb (53.1 kg)   05/02/25 118 lb (53.5 kg)   04/04/25 119 lb (54 kg)   09/30/24 119 lb (54 kg)   08/30/24 120 lb (54.4 kg)      Body mass index is 22.3 kg/m².        Current Medications[1]   Past Medical History[2]   Past Surgical History[3]   Social History:    Short Social Hx on File[4]    Family History[5]  REVIEW OF SYSTEMS:   GENERAL HEALTH: Overall feels well.    RESPIRATORY: Denies: GERMAN/MENDIOLA  CARDIOVASCULAR: Denies CP/palpitations  Back: As in HPI  NEURO: denies headaches/dizziness  PSYCH: denies depression and anxiety    Immunization History   Administered Date(s) Administered    Covid-19 Vaccine Moderna 100 mcg/0.5 ml 03/02/2021, 03/30/2021, 11/18/2021    Covid-19 Vaccine Moderna Bivalent 50mcg/0.5mL 12+ years 11/21/2022    FLU VAC High Dose 65 YRS & Older PRSV Free (84612) 10/16/2020, 10/27/2021, 10/19/2022, 10/19/2023    FLUAD High Dose 65 yr and older (32920) 10/16/2018    Fluvirin, 3 Years & >, Im 10/18/2014    High Dose Fluzone Influenza Vaccine, 65yr+ PF 0.5mL (92754) 10/04/2016, 10/08/2017, 10/05/2019, 10/16/2020, 10/27/2021, 10/19/2023, 09/09/2024    Moderna Covid-19 Vaccine 50mcg/0.5ml 12yrs+ 09/09/2024    Pneumococcal (Prevnar 13) 11/15/2019    Pneumovax 23 11/18/2020    TDAP 10/08/2015    Zoster Vaccine Live (Zostavax) 08/15/2014    Zoster Vaccine Recombinant Adjuvanted (Shingrix) 09/10/2020, 11/02/2020   Deferred Date(s) Deferred    FLU VAC High Dose 65 YRS & Older PRSV Free (08683) 10/05/2022       EXAM:   /78   Pulse 80   Temp 97.4 °F (36.3 °C) (Temporal)   Resp 20   Ht  5' 1\" (1.549 m)   Wt 118 lb (53.5 kg)   SpO2 98%   BMI 22.30 kg/m²   GENERAL: NAD, pleasant well-appearing older  female  INTEGUMENT: no visible rashes  HEAD: NCAT  VASCULAR: No lower extremity edema  Musculoskeletal: Thoracic and lumbar spine: No point tenderness of spinous processes or transverse processes, paraspinal muscles nontender to palpation.  NEURO: Alert and Oriented x3.  No gross motor or gross sensory abnormalities.  PSYCH: Affect normal.  Normal thought content.        DATA:    6/30/2025 [facility] ER provider note and test results read and reviewed.    Results for orders placed or performed during the hospital encounter of 06/30/25   CBC With Differential With Platelet    Collection Time: 06/30/25 11:21 AM   Result Value Ref Range    WBC 5.5 4.0 - 11.0 x10(3) uL    RBC 4.90 3.80 - 5.30 x10(6)uL    HGB 15.3 12.0 - 16.0 g/dL    HCT 44.2 35.0 - 48.0 %    .0 150.0 - 450.0 10(3)uL    MCV 90.2 80.0 - 100.0 fL    MCH 31.2 26.0 - 34.0 pg    MCHC 34.6 31.0 - 37.0 g/dL    RDW 14.2 %    Neutrophil Absolute Prelim 3.58 1.50 - 7.70 x10 (3) uL    Neutrophil Absolute 3.58 1.50 - 7.70 x10(3) uL    Lymphocyte Absolute 1.46 1.00 - 4.00 x10(3) uL    Monocyte Absolute 0.39 0.10 - 1.00 x10(3) uL    Eosinophil Absolute 0.04 0.00 - 0.70 x10(3) uL    Basophil Absolute 0.03 0.00 - 0.20 x10(3) uL    Immature Granulocyte Absolute 0.03 0.00 - 1.00 x10(3) uL    Neutrophil % 64.8 %    Lymphocyte % 26.4 %    Monocyte % 7.1 %    Eosinophil % 0.7 %    Basophil % 0.5 %    Immature Granulocyte % 0.5 %   Comp Metabolic Panel (14)    Collection Time: 06/30/25 11:22 AM   Result Value Ref Range    Glucose 96 70 - 99 mg/dL    Sodium 137 136 - 145 mmol/L    Potassium 4.0 3.5 - 5.1 mmol/L    Chloride 101 98 - 112 mmol/L    CO2 27.0 21.0 - 32.0 mmol/L    Anion Gap 9 0 - 18 mmol/L    BUN 14 9 - 23 mg/dL    Creatinine 0.82 0.55 - 1.02 mg/dL    Calcium, Total 10.3 8.7 - 10.6 mg/dL    Calculated Osmolality 284  275 - 295 mOsm/kg    eGFR-Cr 74 >=60 mL/min/1.73m2    AST 19 <34 U/L    ALT 11 10 - 49 U/L    Alkaline Phosphatase 69 55 - 142 U/L    Bilirubin, Total 0.8 0.2 - 1.1 mg/dL    Total Protein 7.3 5.7 - 8.2 g/dL    Albumin 4.9 (H) 3.2 - 4.8 g/dL    Globulin  2.4 2.0 - 3.5 g/dL    A/G Ratio 2.0 1.0 - 2.0   Lipase    Collection Time: 06/30/25 11:22 AM   Result Value Ref Range    Lipase 39 12 - 53 U/L   Urinalysis with Culture Reflex    Collection Time: 06/30/25 11:26 AM    Specimen: Urine, clean catch   Result Value Ref Range    Urine Color Yellow Yellow    Clarity Urine Hazy (A) Clear    Spec Gravity 1.015 1.005 - 1.030    Glucose Urine Negative Negative mg/dL    Bilirubin Urine Negative Negative    Ketones Urine Negative Negative mg/dL    Blood Urine Negative Negative    pH Urine 7.0 5.0 - 8.0    Protein Urine Negative Negative mg/dL    Urobilinogen Urine 0.2 <2.0 mg/dL    Nitrite Urine Negative Negative    Leukocyte Esterase Urine Small (A) Negative   UA Microscopic only, urine    Collection Time: 06/30/25 11:26 AM   Result Value Ref Range    WBC Urine 1-5 0 - 5 /HPF    RBC Urine 0-2 0 - 2 /HPF    Bacteria Urine Rare (A) None Seen /HPF    Squamous Epi. Cells Few (A) None Seen /HPF    Renal Tubular Epithelial Cells None Seen None Seen /HPF    Transitional Cells None Seen None Seen /HPF    Yeast Urine None Seen None Seen /HPF   Urine Culture, Routine    Collection Time: 06/30/25 11:26 AM    Specimen: Urine, clean catch   Result Value Ref Range    Urine Culture No Growth at 18-24 hrs.        CT ABDOMEN+PELVIS KIDNEYSTONE 2D RNDR(NO IV,NO ORAL)(CPT=74176)  Result Date: 6/30/2025  CONCLUSION: No kidney stone or hydronephrosis. Electronically Verified and Signed by Attending Radiologist: Aaron Franklin MD 6/30/2025 12:17 PM Workstation: [id number]          ASSESSMENT AND PLAN:       Encounter Diagnosis   Name Primary?    Thoracolumbar back pain Yes       1. Thoracolumbar back pain  Currently resolved.  Appropriate lifting  technique discussed.  RTC if symptoms recur.        Return in about 9 months (around 4/14/2026) for Medicare Annal Wellness Visit. Sooner if needed..           [1]   Current Outpatient Medications   Medication Sig Dispense Refill    Boswellia-Glucosamine-Vit D (OSTEO BI-FLEX ONE PER DAY OR) Take by mouth.      cholecalciferol 50 MCG (2000 UT) Oral Cap Take 1 capsule (2,000 Units total) by mouth in the morning.      Calcium-Vitamin D-Vitamin K 600-1000-90 MG-UNT-MCG Oral Tab Take 1 tablet by mouth in the morning.     [2]   Past Medical History:   Acute delirium    Acute encephalopathy    Age-related osteoporosis without current pathological fracture    1/4/2020 DEXA    Anxiety state    Colonoscopy refused    Current severe episode of major depressive disorder with psychotic features without prior episode (HCC)    This occurred postoperatively      Depression    Essential hypertension    12/13/2019 New Diagnosis    Family history of premature CAD    Mother had fatal MI age 60 yo.    High blood pressure    History of 2019 novel coronavirus disease (COVID-19)    July 2022    Hypercholesterolemia    Lesion of left upper eyelid    Neoplasm of uncertain behavior of skin of face    Osteoarthritis    Osteopenia    Seborrheic keratosis   [3]   Past Surgical History:  Procedure Laterality Date    Correct bunion,othr methods      Drain pilonidal cyst simpl  1968    Hysterectomy      Knee replacement surgery  2015    [facility]    Oophorectomy      @ age 44    Total abdominal hysterectomy  1994    [facility]    Total knee replacement     [4]   Social History  Socioeconomic History    Marital status: Single   Tobacco Use    Smoking status: Never     Passive exposure: Never    Smokeless tobacco: Never   Vaping Use    Vaping status: Never Used   Substance and Sexual Activity    Alcohol use: Yes     Comment: Occ    Drug use: No    Sexual activity: Yes     Partners: Male   Other Topics Concern     Service No     Blood Transfusions No    Caffeine Concern Yes     Comment: 2 cups coffee daily    Occupational Exposure No    Hobby Hazards No    Sleep Concern No    Stress Concern No    Weight Concern Yes    Special Diet No    Back Care No    Exercise Yes     Comment: Bike, weights and step climber at home everyday    Bike Helmet No    Seat Belt Yes    Self-Exams No     Social Drivers of Health     Food Insecurity: No Food Insecurity (7/14/2025)    NCSS - Food Insecurity     Worried About Running Out of Food in the Last Year: No     Ran Out of Food in the Last Year: No   Transportation Needs: No Transportation Needs (7/14/2025)    NCSS - Transportation     Lack of Transportation: No   Housing Stability: Not At Risk (7/14/2025)    NCSS - Housing/Utilities     Has Housing: Yes     Worried About Losing Housing: No     Unable to Get Utilities: No   [5]   Family History  Problem Relation Age of Onset    Diabetes Father     Heart Disorder Mother     Heart Attack Mother     Cancer Maternal Grandmother     Stroke Maternal Grandfather     Heart Attack Paternal Grandfather

## (undated) DEVICE — GMK SPHERE KNEE: Type: IMPLANTABLE DEVICE

## (undated) DEVICE — ISOPROPYL ALCOHOL 70% 4OZ BTL

## (undated) DEVICE — E-Z BUTTON SWITCH PENCIL

## (undated) DEVICE — SCREWS PACK: Brand: KNEE INSTRUMENTS

## (undated) DEVICE — DRAPE,U/SHT,SPLIT,FILM,60X84,STERILE: Brand: MEDLINE

## (undated) DEVICE — SUT VICRYL 2-0 CP-1 J266H

## (undated) DEVICE — SLEEVE KENDALL SCD EXPRESS MED

## (undated) DEVICE — DISPOSABLE TOURNIQUET CUFF SINGLE BLADDER, DUAL PORT AND QUICK CONNECT CONNECTOR: Brand: COLOR CUFF

## (undated) DEVICE — 1010 S-DRAPE TOWEL DRAPE 10/BX: Brand: STERI-DRAPE™

## (undated) DEVICE — BOWL CEMENT MIX QUICK-VAC

## (undated) DEVICE — SYRINGE 30ML LL TIP

## (undated) DEVICE — SHORT THREADED PINS PACK: Brand: KNEE INSTRUMENTS

## (undated) DEVICE — DRESS WOUND AQUACEL 3.5INX12IN

## (undated) DEVICE — SPECIMEN CONTAINER,POSITIVE SEAL INDICATOR, OR PACKAGED: Brand: PRECISION

## (undated) DEVICE — PREMIUM WET SKIN PREP TRAY: Brand: MEDLINE INDUSTRIES, INC.

## (undated) DEVICE — 3M™ IOBAN™ 2 ANTIMICROBIAL INCISE DRAPE 6651EZ: Brand: IOBAN™ 2

## (undated) DEVICE — TIP CLEANER: Brand: VALLEYLAB

## (undated) DEVICE — LIGHT HANDLE

## (undated) DEVICE — TOWEL SURG OR 17X30IN BLUE

## (undated) DEVICE — SOLUTION  .9 3000ML

## (undated) DEVICE — LAPAROTOMY SPONGE - RF AND X-RAY DETECTABLE PRE-WASHED: Brand: SITUATE

## (undated) DEVICE — WRAP COOLING KNEE W/ICE PILLOW

## (undated) DEVICE — BANDAGE COMP PREMPRO 5YDX4IN

## (undated) DEVICE — STERILE POLYISOPRENE POWDER-FREE SURGICAL GLOVES: Brand: PROTEXIS

## (undated) DEVICE — Device: Brand: STABLECUT®

## (undated) DEVICE — SUT ETHIBOND 5 V-37 MB66G

## (undated) DEVICE — THREADED PINS PACK: Brand: KNEE INSTRUMENTS

## (undated) DEVICE — NEEDLE SPINAL 18X3-1/2 PINK.

## (undated) DEVICE — SMOOTH PINS PACK: Brand: KNEE INSTRUMENTS

## (undated) DEVICE — GOWN AERO CHROME XXL

## (undated) DEVICE — SURGICAL SCRB HIBICLENS 4OZ

## (undated) DEVICE — HOOD: Brand: FLYTE

## (undated) DEVICE — SWORD PIN PACK: Brand: KNEE INSTRUMENTS

## (undated) DEVICE — STERILE POLYISOPRENE POWDER-FREE SURGICAL GLOVES WITH EMOLLIENT COATING: Brand: PROTEXIS

## (undated) DEVICE — TUBING MEGADYNE SPECULUM

## (undated) DEVICE — PADDING CAST COTTON  4

## (undated) DEVICE — HOOD, PEEL-AWAY: Brand: FLYTE

## (undated) DEVICE — TOTAL HIP CDS: Brand: MEDLINE INDUSTRIES, INC.

## (undated) DEVICE — SCRUB PVP -1 PREP SOLUTION 4OZ

## (undated) NOTE — IP AVS SNAPSHOT
[address]            (For Outpatient Use Only) Initial Admit Date: 2022   Inpt/Obs Admit Date: Inpt: 22 / Obs: N/A   Discharge Date:    Renae Sidhu:  [de-identified]   MRN: [de-identified]   CSN: [id number]   CEID: [id number]        ENCOUNTER  Patient Class: Inpatient Admitting Provider: No admitting provider for patient encounter. Unit: [address] [place] [address] Service: Med/Behavioral Attending Provider: Annika Kingston MD   Bed: 7307-S   Visit Type:   Referring Physician: No ref. provider found Billing Flag:    Admit Diagnosis: Hyponatremia [E87.1]      PATIENT  Legal Name:   June Bates   Legal Sex: Female  Gender ID: Female             Pref Name:    PCP:  Patti Landry DO Home: [phone]   Address:  [place] [address] : 1950 (72 yrs) Mobile: [phone]         City/State/Zip: [address] [place] [address], [facility] [address] [facility] [address] Marital: Single Language: [address] [place] [address] Drive: Will SSN4: [id number] Synagogue: Wishes Privacy     Race: White Ethnicity: Non  Or  O*   EMERGENCY CONTACT   Name Relationship Legal Guardian? Home Phone Work Phone Mobile Phone   1. Dell Jones  2.  Angélica Higuera Son  [facility]          [phone]     GUARANTOR  Nupur Herring : 1950 Home Phone: [phone]   Address: [place] [address]  Sex: Female Work Phone:    City/VA hospital/Zip: [address] [place] [address] [address] [facility] [address]   Rel. to Patient: Self Guarantor ID: [id number]   GUARANTOR EMPLOYER   Employer:  Status: RETIRED     COVERAGE  PRIMARY INSURANCE   Payor: MEDICARE Plan: MEDICARE PART A&B   Group Number:  Insurance Type: INDEMNITY   Subscriber Name: Radha Jackson : 1950   Subscriber ID: [id number] Pt Rel to Subscriber: Self   SECONDARY INSURANCE   Payor: COMMERCIAL Plan: [facility]   Group Number:  Insurance Type: INDEMNITY   Subscriber Name: Radha Jackson : 1950   Subscriber ID: [id number] Pt Rel to Subscriber: SELF   TERTIARY INSURANCE   Payor: Plan:    Group Number:  Insurance Type:    Subscriber Name:  Subscriber :    Subscriber ID:  Pt Rel to Subscriber:    Hospital Account Financial Class: Medicare    2022

## (undated) NOTE — LETTER
OUTSIDE TESTING RESULT REQUEST     IMPORTANT: FOR YOUR IMMEDIATE ATTENTION  Please FAX all test results listed below to: [phone]     Testing already done on or about: 8/15/22    * * * * If testing is NOT complete, arrange with patient A.S.A.P. * * * *      Patient Name: Hoa Pedro  Surgery Date: 10/4/2022  CSN: [id number]  Medical Record: [id number]   : 1950 - A: 67 y      Sex: female  Surgeon(s):  Sophie Guerra MD  Procedure: RIGHT TOTAL KNEE ARTHROPLASTY.    Anesthesia Type: Choice     Surgeon: Sophie Guerra MD     The following Testing and Time Line are REQUIRED PER ANESTHESIA     EKG READ AND SIGNED WITHIN   90 days  ( please send actual tracing, confirmed )      Thank You,   Sent by:Denise TIWARI

## (undated) NOTE — LETTER
3/9/2021       Nathan Gray [address]      Dear Zaid Sinclair,    IF YOU ARE 48YEARS OF AGE OR OLDER, COLORECTAL CANCER SCREENING CAN SAVE YOUR LIFE.     As your primary care doctor, I want to do everything I can to protect y

## (undated) NOTE — LETTER
11/15/18        [facility]      Dear Catalino Brown,    [facility] records indicate that you have outstanding lab work and or testing that was ordered for you and has not yet been completed:        Hemoccult, Feces (send

## (undated) NOTE — LETTER
Patient Name: Amirah Barker CSN: [id number]  -Age / Sex: 1950-A: 67 y  female Medical Records: [id number]    ABNORMAL VALUES  Surgeon(s):  Luis Peters MD  Anesthesia Type: Choice  Procedure Description: RIGHT TOTAL KNEE ARTHROPLASTY.    Primary Surgeon:  Luis Peters MD  Phone Number: [phone]    PLEASE NOTE THE FOLLOWING ABNORMALITIES:      SODIUM 131 FROM 22 DRAW

## (undated) NOTE — LETTER
[facility] Testing Department  Phone: [phone]  OUTSIDE TESTING RESULT REQUEST      TO:  Dr. Ashli Nicolas,     Today's Date: 22    FAX #: [phone]     IMPORTANT: FOR YOUR IMMEDIATE ATTENTION  Please FAX all test results listed below to: [phone]     Testing already done on or about:       * * * If testing is NOT complete, arrange with patient A.S.A.P. * * *       Patient Name: Dion Low  Surgery Date: 10/4/2022    CSN: [id number]  Medical Record: [id number]   : 1950 - A: 67 y      Sex: female  Surgeon(s):  Jono Garcia MD  Procedure: RIGHT TOTAL KNEE ARTHROPLASTY. Anesthesia Type: Choice     Surgeon: Jono Garcia MD       The following Testing and Time Line are REQUIRED PER ANESTHESIA   1)  EKG within the past    2) CMP within      Days Of the procedure    3) CBC within       Days of procedure    4) PT/INR within   Days of the procedure     5) PTT  Within     Days of the procedure      Thank You,   Sent by:Jessica CORONEL RN      CONFIDENTIALITY NOTICE  This transmission is intended only for the use of the individual or entity to which it is addressed and may contain information that is privileged and confidential.  If the reader of this message is not the intended recipient, you are hereby notified that any disclosure, distribution, or copying of this information is strictly prohibited. If you have received this transmission in error, please notify us immediately by telephone, and return the original documents to us at the address listed above.

## (undated) NOTE — LETTER
22  [address] [facility]  Pre-Operative Clearance Request    Patient Name:   Roula Okeefe             :   1950    Surgeon: Dr. Trey Alcaraz             Date of Surgery: 10/4/22     Surgical Procedure: RIGHT TOTAL KNEE ARTHROPLASTY. Please Complete: 2-3 WEEKS PRIOR TO SURGERY TO AVOID CANCELLATION OF SURGERY. [x]  History and Physical        [x]  Medical  Clearance                     Testing is ordered by Keiko MIRELES per anesthesia guidelines                       [x]  CBC W/Diff                                                                   [x]  CMP                                                                                                                                                                                  [x]  PT/INR    [x]  PTT     [x]  Type and Screen                           **Please fax test results, H&P, and clearance to [phone] and to                                      P. A.T at [phone]**